# Patient Record
Sex: FEMALE | Race: WHITE | NOT HISPANIC OR LATINO | Employment: UNEMPLOYED | URBAN - METROPOLITAN AREA
[De-identification: names, ages, dates, MRNs, and addresses within clinical notes are randomized per-mention and may not be internally consistent; named-entity substitution may affect disease eponyms.]

---

## 2018-07-31 ENCOUNTER — HOSPITAL ENCOUNTER (EMERGENCY)
Facility: CLINIC | Age: 43
Discharge: HOME | End: 2018-08-01
Attending: EMERGENCY MEDICINE | Admitting: EMERGENCY MEDICINE
Payer: COMMERCIAL

## 2018-07-31 DIAGNOSIS — F10.920 ALCOHOLIC INTOXICATION WITHOUT COMPLICATION (H): ICD-10-CM

## 2018-07-31 DIAGNOSIS — R45.851 SUICIDE IDEATION: ICD-10-CM

## 2018-07-31 LAB
ALCOHOL BREATH TEST: 0.34 (ref 0–0.01)
ETHANOL SERPL-MCNC: 0.37 G/DL
INTERPRETATION ECG - MUSE: NORMAL

## 2018-07-31 PROCEDURE — 96372 THER/PROPH/DIAG INJ SC/IM: CPT | Performed by: EMERGENCY MEDICINE

## 2018-07-31 PROCEDURE — 25000132 ZZH RX MED GY IP 250 OP 250 PS 637: Performed by: EMERGENCY MEDICINE

## 2018-07-31 PROCEDURE — 90791 PSYCH DIAGNOSTIC EVALUATION: CPT

## 2018-07-31 PROCEDURE — 93005 ELECTROCARDIOGRAM TRACING: CPT | Performed by: EMERGENCY MEDICINE

## 2018-07-31 PROCEDURE — 80320 DRUG SCREEN QUANTALCOHOLS: CPT | Performed by: EMERGENCY MEDICINE

## 2018-07-31 PROCEDURE — 96360 HYDRATION IV INFUSION INIT: CPT | Performed by: EMERGENCY MEDICINE

## 2018-07-31 PROCEDURE — 96361 HYDRATE IV INFUSION ADD-ON: CPT | Performed by: EMERGENCY MEDICINE

## 2018-07-31 PROCEDURE — 99285 EMERGENCY DEPT VISIT HI MDM: CPT | Mod: 25 | Performed by: EMERGENCY MEDICINE

## 2018-07-31 PROCEDURE — 99284 EMERGENCY DEPT VISIT MOD MDM: CPT | Mod: 25 | Performed by: EMERGENCY MEDICINE

## 2018-07-31 PROCEDURE — 25000125 ZZHC RX 250: Performed by: EMERGENCY MEDICINE

## 2018-07-31 PROCEDURE — 93010 ELECTROCARDIOGRAM REPORT: CPT | Mod: Z6 | Performed by: EMERGENCY MEDICINE

## 2018-07-31 PROCEDURE — 25000128 H RX IP 250 OP 636: Performed by: EMERGENCY MEDICINE

## 2018-07-31 PROCEDURE — 82075 ASSAY OF BREATH ETHANOL: CPT | Performed by: EMERGENCY MEDICINE

## 2018-07-31 RX ORDER — POLYETHYLENE GLYCOL 3350 17 G
2 POWDER IN PACKET (EA) ORAL
Status: DISCONTINUED | OUTPATIENT
Start: 2018-07-31 | End: 2018-08-01 | Stop reason: HOSPADM

## 2018-07-31 RX ORDER — OLANZAPINE 10 MG/2ML
10 INJECTION, POWDER, FOR SOLUTION INTRAMUSCULAR ONCE
Status: COMPLETED | OUTPATIENT
Start: 2018-07-31 | End: 2018-07-31

## 2018-07-31 RX ORDER — SODIUM CHLORIDE 9 MG/ML
1000 INJECTION, SOLUTION INTRAVENOUS CONTINUOUS
Status: DISCONTINUED | OUTPATIENT
Start: 2018-07-31 | End: 2018-08-01 | Stop reason: HOSPADM

## 2018-07-31 RX ORDER — LANOLIN ALCOHOL/MO/W.PET/CERES
100 CREAM (GRAM) TOPICAL ONCE
Status: COMPLETED | OUTPATIENT
Start: 2018-07-31 | End: 2018-07-31

## 2018-07-31 RX ORDER — FOLIC ACID 1 MG/1
1 TABLET ORAL ONCE
Status: COMPLETED | OUTPATIENT
Start: 2018-07-31 | End: 2018-07-31

## 2018-07-31 RX ADMIN — OLANZAPINE 10 MG: 10 INJECTION, POWDER, LYOPHILIZED, FOR SOLUTION INTRAMUSCULAR at 19:30

## 2018-07-31 RX ADMIN — NICOTINE POLACRILEX 2 MG: 2 LOZENGE ORAL at 20:36

## 2018-07-31 RX ADMIN — SODIUM CHLORIDE 1000 ML: 9 INJECTION, SOLUTION INTRAVENOUS at 20:01

## 2018-07-31 RX ADMIN — FOLIC ACID 1 MG: 1 TABLET ORAL at 19:25

## 2018-07-31 RX ADMIN — SODIUM CHLORIDE 1000 ML: 9 INJECTION, SOLUTION INTRAVENOUS at 17:08

## 2018-07-31 RX ADMIN — Medication 100 MG: at 19:25

## 2018-07-31 RX ADMIN — SODIUM CHLORIDE 1000 ML: 9 INJECTION, SOLUTION INTRAVENOUS at 18:30
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
## 2018-07-31 NOTE — ED AVS SNAPSHOT
[facility], [place], Emergency Department    [address] [place] [address]    Phone:  [phone]                                       Pako Miller   MRN: [id number]    Department:  [facility], Emergency Department   Date of Visit:  7/31/2018           After Visit Summary Signature Page     I have received my discharge instructions, and my questions have been answered. I have discussed any challenges I see with this plan with the nurse or doctor.    ..........................................................................................................................................  Patient/Patient Representative Signature      ..........................................................................................................................................  Patient Representative Print Name and Relationship to Patient    ..................................................               ................................................  Date                                            Time    ..........................................................................................................................................  Reviewed by Signature/Title    ...................................................              ..............................................  Date                                                            Time
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
## 2018-07-31 NOTE — ED AVS SNAPSHOT
[facility], Emergency Department    [address] [facility] [address]    Phone:  [phone]                                       Pako Miller   MRN: [id number]    Department:  [facility], Emergency Department   Date of Visit:  7/31/2018           Patient Information     Date Of Birth          1975        Your diagnoses for this visit were:     Alcoholic intoxication without complication (H)     Suicide ideation        You were seen by Kristian Betancourt MD and Berny Moore MD.        Discharge Instructions         If you decide you would like help or are interested in detox please call to check bed availability.   To check the status of detox bed availability at [facility] call:  [phone]  To check the status of detox availability at [facility] call:  [phone]  To check the status of detox bed availability at [address] [place] [address] call:  [phone]  If you desire chemical dependency assessment or counseling, follow up with [facility]: [phone]    Alcohol Intoxication  Alcohol intoxication occurs when you drink alcohol faster than your liver can remove it from your system. The following facts are important to remember:    It can take 10 minutes or more to start to feel the effects of a drink, so you can easily get more intoxicated than you intended.    One drink may be more than 1 serving of alcohol. Depending on the drink, it can be 2 to 4 servings.    It takes about an hour for your body to metabolize (clear) 1 serving. If you have more than 1 drink, it can take a couple of hours or more.    Many things affect how drinks will affect you, including whether you ve eaten, how fast you drink, your size, how much you normally drink (or not), medicines you take, chronic diseases you have, and gender.  Signs and symptoms of alcohol poisoning  The following are signs and symptoms of alcohol poisoning:  Mild impairment    Reduced inhibitions    Slurred  "speech    Drowsiness    Decreased fine motor skills  Moderate impairment    Erratic behavior, aggression, depression    Impaired judgment    Confusion    Concentration difficulties    Coordination problems  Severe impairment    Vomiting    Seizures    Unconsciousness    Cold, clammy    Slow or irregular breathing    Hypothermia (low body temperature)    Coma  Health effects  Alcohol abuse causes health problems. Sometimes this can happen after only drinking a  little.\" There is no set number of drinks or amount of alcohol that defines too much. The more you drink at one time, and the more frequently you drink determine both the short-term and long-term health effects. It affects all parts of your body and your health, including your:    Brain. Alcohol is a central nervous system depressant. It can damage parts of the brain that affect your balance, memory, thinking, and emotions. It can cause memory loss, blackouts, depression, agitation, sleep cycle changes, and seizures. These changes may or may not be reversible.    Heart and vascular system. Alcohol affects multiple areas. It can damage heart muscle causing cardiomyopathy, which is a weakening and stretching of the heart muscle. This can lead to trouble breathing, an irregular heartbeat, atrial fibrillation, leg swelling, and heart failure. It makes the blood vessels stiffen causing hypertension (high blood pressure). All of these problems increase your risk of having heart attacks or strokes.    Liver. Alcohol causes fat to build up in the liver, affecting its normal function. This increases the risk for hepatitis, leading to abdominal pain, appetite loss, jaundice, bleeding problems, liver fibrosis, and cirrhosis. This in turn can affect your ability to fight off infections, and can cause diabetes. The liver changes prevent it from removing toxins in your blood that can cause encephalopathy. Signs of this are confusion, altered level of consciousness, " personality changes, memory loss, seizures, coma, and death.    Pancreas. Alcohol can cause inflammation of the pancreas, or pancreatitis. This can cause pain in your abdomen, fever, and diabetes.    Immune system. Alcohol weakens your immune system in a number of ways. It suppresses your immune system making it harder to fight off infections and colds. You will also have a higher risk of certain infections like pneumonia and tuberculosis.    Cancer risk. Alcohol raises your risk of cancer of the mouth, esophagus, pharynx, larynx, liver, and breast.    Sexual function. Alcohol abuse can also lead to sexual problems.  Alcohol use during pregnancy may cause permanent damage to the growing baby.  Home care  The following guidelines will help you care for yourself at home:    Don't drink any more alcohol.    Don't drive until all effects of the alcohol have worn off.    Don't operate machinery that can cause injuries.    Get lots of rest over the next few days. Drink plenty of water and other non-alcoholic liquids. Try to eat regular meals.    If you have been drinking heavily on a daily basis, you may go through alcohol withdrawal. The usual symptoms last 3 to 4 days and may include nervousness, shakiness, nausea, sweating, sleeplessness, and can even cause seizures and a serious withdrawal symptom called delirium tremens, or DTs. During this time, it is best that you stay with family or friends who can help and support you. You can also admit yourself to a residential detox program. If your symptoms are severe (seizures, severe shakiness, confusion), contact your doctor or call an ambulance for help (see below).   Follow-up care  If alcohol is a problem in your life, these are some organizations that can help you:    Alcoholics Anonymous offers support through a self-help fellowship. There are no dues or fees. See the Yellow Pages and call for time and place of meetings. Find AA online at www.aa.org.    [employer] offers  support to families of alcohol users. Contact 832-273-3876, or online at www.al-anon.org.    National [place] on Alcoholism and Drug Dependence can be reached at 635-437-6615, or online at www.ncadd.org.    There are also inpatient and residential alcohol detox programs. Check the Internet or phonebook Yellow Pages under  Drug Abuse and Treatment Centers.   Call 911  Call 911 if any of these occur:    Trouble breathing or slow irregular breathing    Chest pain    Sudden weakness on one side of your body or sudden trouble speaking    Heavy bleeding or vomiting blood    Very drowsy or trouble awakening    Fainting or loss of consciousness    Rapid heart rate    Seizure  When to seek medical advice  Call your healthcare provider right away if any of these occur:    Severe shakiness     Fever of 100.4 F (38 C) or higher, or as directed by your healthcare provider    Confusion or hallucinations (seeing, hearing, or feeling things that are not there)    Pain in your upper abdomen that gets worse    Repeated vomiting  Date Last Reviewed: 6/1/2016 2000-2017 [employer]. [address] [place] [address]. All rights reserved. This information is not intended as a substitute for professional medical care. Always follow your healthcare professional's instructions.          24 Hour Appointment Hotline       To make an appointment at any [facility], call [phone] ([phone]). If you don't have a family doctor or clinic, we will help you find one. [place] clinics are conveniently located to serve the needs of you and your family.             Review of your medicines      Our records show that you are taking the medicines listed below. If these are incorrect, please call your family doctor or clinic.        Dose / Directions Last dose taken    CoQ10 100 MG Caps   Dose:  100 mg        Take 100 mg by mouth daily   Refills:  0        FOLIC ACID PO        Take by mouth daily   Refills:  0         MAGNESIUM OXIDE PO   Dose:  250 mg        Take 250 mg by mouth daily   Refills:  0        Multi-vitamin Tabs tablet   Dose:  1 tablet   Quantity:  100 tablet        Take 1 tablet by mouth daily.   Refills:  3        naltrexone 380 MG Susr   Commonly known as:  VIVITROL   Dose:  380 mg        Inject 380 mg into the muscle once   Refills:  0        * VITAMIN B 12 PO        Refills:  0        * cyanocolbalamin 100 MCG tablet   Commonly known as:  vitamin  B-12   Dose:  500 mcg        Take 500 mcg by mouth daily   Refills:  0        WELLBUTRIN PO   Dose:  300 mg        Take 300 mg by mouth daily   Refills:  0        * Notice:  This list has 2 medication(s) that are the same as other medications prescribed for you. Read the directions carefully, and ask your doctor or other care provider to review them with you.            Procedures and tests performed during your visit     Alcohol breath test POCT    Alcohol ethyl    EKG 12-lead, tracing only      Orders Needing Specimen Collection     None      Pending Results     No orders found for last 3 day(s).            Pending Culture Results     No orders found for last 3 day(s).            Pending Results Instructions     If you had any lab results that were not finalized at the time of your Discharge, you can call the ED Lab Result RN at [phone]. You will be contacted by this team for any positive Lab results or changes in treatment. The nurses are available 7 days a week from 10A to 6:30P.  You can leave a message 24 hours per day and they will return your call.        Thank you for choosing [facility]       Thank you for choosing [facility] for your care. Our goal is always to provide you with excellent care. Hearing back from our patients is one way we can continue to improve our services. Please take a few minutes to complete the written survey that you may receive in the mail after you visit with us. Thank you!        [facility] Information     [facility] lets you send  "messages to your doctor, view your test results, renew your prescriptions, schedule appointments and more. To sign up, go to [email or web address] . Click on \"Log in\" on the left side of the screen, which will take you to the Welcome page. Then click on \"Sign up Now\" on the right side of the page.     You will be asked to enter the access code listed below, as well as some personal information. Please follow the directions to create your username and password.     Your access code is: [email or web address]  Expires: 10/29/2018  8:28 PM     Your access code will  in 90 days. If you need help or a new code, please call your [facility] clinic or [phone].        Care EveryWhere ID     This is your Care EveryWhere ID. This could be used by other organizations to access your [facility] medical records  [id number]        Equal Access to Services     [facility] : Hadii rodney Mckeon, waaxda luandrew, qaybta kaalmada aretha, ian bunn . So [facility] [phone].    ATENCIÓN: Si habla español, tiene a kulkarni disposición servicios gratuitos de asistencia lingüística. Llame al [phone].    We comply with applicable federal civil rights laws and [place] laws. We do not discriminate on the basis of race, color, national origin, age, disability, sex, sexual orientation, or gender identity.            After Visit Summary       This is your record. Keep this with you and show to your community pharmacist(s) and doctor(s) at your next visit.                  "

## 2018-08-01 VITALS
RESPIRATION RATE: 16 BRPM | SYSTOLIC BLOOD PRESSURE: 129 MMHG | OXYGEN SATURATION: 98 % | HEART RATE: 109 BPM | DIASTOLIC BLOOD PRESSURE: 79 MMHG | TEMPERATURE: 98.5 F

## 2018-08-01 PROCEDURE — 25000132 ZZH RX MED GY IP 250 OP 250 PS 637: Performed by: EMERGENCY MEDICINE

## 2018-08-01 RX ORDER — LORAZEPAM 0.5 MG/1
1 TABLET ORAL ONCE
Status: COMPLETED | OUTPATIENT
Start: 2018-08-01 | End: 2018-08-01

## 2018-08-01 RX ADMIN — LORAZEPAM 1 MG: 0.5 TABLET ORAL at 02:28

## 2018-08-01 RX ADMIN — LORAZEPAM 1 MG: 0.5 TABLET ORAL at 03:53

## 2018-08-01 NOTE — ED NOTES
Sign Out Provider: Dr. Moore    Sign Out Plan: 43-year-old female history of substance abuse, depression, anxiety who presents emergency department with a complaint of acute alcohol intoxication and suicide ideation.  Pending behavioral health assessment once clinically sober.    Reassessment: No acute interventions during my shift.  Patient had behavioral health assessment and I assess the patient as well.  Once patient clinically sober she is ambulating without difficulty, patient denies any suicidal thoughts, intent of self-harm, or suicide plan.  Patient is currently undergoing some stressors at home including her  who states that he wants to divorce her.  Patient would like to go home.  At this time no emergent need as patient is not a harm to herself or others.  Patient is clinically sober and denies any suicidal thoughts.  Patient does not want detox.  Plan for discharge home.  Return precautions discussed.  Patient understands and agrees the plan.    Disposition: discharge home       Elsa Hebert MD  08/01/18 8431

## 2018-08-01 NOTE — DISCHARGE INSTRUCTIONS
"  If you decide you would like help or are interested in detox please call to check bed availability.   To check the status of detox bed availability at [facility] call:  [phone]  To check the status of detox availability at [facility] call:  [phone]  To check the status of detox bed availability at [address] [place] [address] call:  [phone]  If you desire chemical dependency assessment or counseling, follow up with [facility]: [phone]    Alcohol Intoxication  Alcohol intoxication occurs when you drink alcohol faster than your liver can remove it from your system. The following facts are important to remember:    It can take 10 minutes or more to start to feel the effects of a drink, so you can easily get more intoxicated than you intended.    One drink may be more than 1 serving of alcohol. Depending on the drink, it can be 2 to 4 servings.    It takes about an hour for your body to metabolize (clear) 1 serving. If you have more than 1 drink, it can take a couple of hours or more.    Many things affect how drinks will affect you, including whether you ve eaten, how fast you drink, your size, how much you normally drink (or not), medicines you take, chronic diseases you have, and gender.  Signs and symptoms of alcohol poisoning  The following are signs and symptoms of alcohol poisoning:  Mild impairment    Reduced inhibitions    Slurred speech    Drowsiness    Decreased fine motor skills  Moderate impairment    Erratic behavior, aggression, depression    Impaired judgment    Confusion    Concentration difficulties    Coordination problems  Severe impairment    Vomiting    Seizures    Unconsciousness    Cold, clammy    Slow or irregular breathing    Hypothermia (low body temperature)    Coma  Health effects  Alcohol abuse causes health problems. Sometimes this can happen after only drinking a  little.\" There is no set number of drinks or amount of alcohol that defines too much. The " more you drink at one time, and the more frequently you drink determine both the short-term and long-term health effects. It affects all parts of your body and your health, including your:    Brain. Alcohol is a central nervous system depressant. It can damage parts of the brain that affect your balance, memory, thinking, and emotions. It can cause memory loss, blackouts, depression, agitation, sleep cycle changes, and seizures. These changes may or may not be reversible.    Heart and vascular system. Alcohol affects multiple areas. It can damage heart muscle causing cardiomyopathy, which is a weakening and stretching of the heart muscle. This can lead to trouble breathing, an irregular heartbeat, atrial fibrillation, leg swelling, and heart failure. It makes the blood vessels stiffen causing hypertension (high blood pressure). All of these problems increase your risk of having heart attacks or strokes.    Liver. Alcohol causes fat to build up in the liver, affecting its normal function. This increases the risk for hepatitis, leading to abdominal pain, appetite loss, jaundice, bleeding problems, liver fibrosis, and cirrhosis. This in turn can affect your ability to fight off infections, and can cause diabetes. The liver changes prevent it from removing toxins in your blood that can cause encephalopathy. Signs of this are confusion, altered level of consciousness, personality changes, memory loss, seizures, coma, and death.    Pancreas. Alcohol can cause inflammation of the pancreas, or pancreatitis. This can cause pain in your abdomen, fever, and diabetes.    Immune system. Alcohol weakens your immune system in a number of ways. It suppresses your immune system making it harder to fight off infections and colds. You will also have a higher risk of certain infections like pneumonia and tuberculosis.    Cancer risk. Alcohol raises your risk of cancer of the mouth, esophagus, pharynx, larynx, liver, and  breast.    Sexual function. Alcohol abuse can also lead to sexual problems.  Alcohol use during pregnancy may cause permanent damage to the growing baby.  Home care  The following guidelines will help you care for yourself at home:    Don't drink any more alcohol.    Don't drive until all effects of the alcohol have worn off.    Don't operate machinery that can cause injuries.    Get lots of rest over the next few days. Drink plenty of water and other non-alcoholic liquids. Try to eat regular meals.    If you have been drinking heavily on a daily basis, you may go through alcohol withdrawal. The usual symptoms last 3 to 4 days and may include nervousness, shakiness, nausea, sweating, sleeplessness, and can even cause seizures and a serious withdrawal symptom called delirium tremens, or DTs. During this time, it is best that you stay with family or friends who can help and support you. You can also admit yourself to a residential detox program. If your symptoms are severe (seizures, severe shakiness, confusion), contact your doctor or call an ambulance for help (see below).   Follow-up care  If alcohol is a problem in your life, these are some organizations that can help you:    Alcoholics Anonymous offers support through a self-help fellowship. There are no dues or fees. See the Yellow Pages and call for time and place of meetings. Find AA online at www.aa.org.    [employer] offers support to families of alcohol users. Contact 744-482-4529, or online at www.al-anobjorn.org.    National [place] on Alcoholism and Drug Dependence can be reached at 743-157-8752, or online at www.ncadd.org.    There are also inpatient and residential alcohol detox programs. Check the Internet or phonebook Yellow Pages under  Drug Abuse and Treatment Centers.   Call 911  Call 911 if any of these occur:    Trouble breathing or slow irregular breathing    Chest pain    Sudden weakness on one side of your body or sudden trouble speaking    Heavy  bleeding or vomiting blood    Very drowsy or trouble awakening    Fainting or loss of consciousness    Rapid heart rate    Seizure  When to seek medical advice  Call your healthcare provider right away if any of these occur:    Severe shakiness     Fever of 100.4 F (38 C) or higher, or as directed by your healthcare provider    Confusion or hallucinations (seeing, hearing, or feeling things that are not there)    Pain in your upper abdomen that gets worse    Repeated vomiting  Date Last Reviewed: 6/1/2016 2000-2017 [employer]. [address] [place] [address]. All rights reserved. This information is not intended as a substitute for professional medical care. Always follow your healthcare professional's instructions.

## 2018-08-01 NOTE — ED NOTES
Notified of pt's elevated heart rate and BP. Ativan ordered. We will give ativan and monitor pt's heart rate for a while.

## 2018-11-20 ENCOUNTER — HOSPITAL ENCOUNTER (INPATIENT)
Facility: CLINIC | Age: 43
LOS: 6 days | Discharge: HOME | End: 2018-11-26
Attending: EMERGENCY MEDICINE | Admitting: PSYCHIATRY & NEUROLOGY
Payer: MEDICAID

## 2018-11-20 DIAGNOSIS — F33.1 MODERATE EPISODE OF RECURRENT MAJOR DEPRESSIVE DISORDER (H): ICD-10-CM

## 2018-11-20 DIAGNOSIS — F10.10 ALCOHOL ABUSE: ICD-10-CM

## 2018-11-20 DIAGNOSIS — T74.21XA SEXUAL ASSAULT OF ADULT, INITIAL ENCOUNTER: ICD-10-CM

## 2018-11-20 DIAGNOSIS — F10.229 ACUTE ALCOHOLIC INTOXICATION IN ALCOHOLISM WITH COMPLICATION (H): ICD-10-CM

## 2018-11-20 DIAGNOSIS — T74.21XA RAPE, INITIAL ENCOUNTER: ICD-10-CM

## 2018-11-20 DIAGNOSIS — Z59.00 HOMELESS: ICD-10-CM

## 2018-11-20 LAB
ALBUMIN SERPL-MCNC: 3.5 G/DL (ref 3.4–5)
ALCOHOL BREATH TEST: 0.12 (ref 0–0.01)
ALCOHOL BREATH TEST: 0.24 (ref 0–0.01)
ALCOHOL BREATH TEST: 0.39 (ref 0–0.01)
ALP SERPL-CCNC: 67 U/L (ref 40–150)
ALT SERPL W P-5'-P-CCNC: 74 U/L (ref 0–50)
ANION GAP SERPL CALCULATED.3IONS-SCNC: 10 MMOL/L (ref 3–14)
AST SERPL W P-5'-P-CCNC: 170 U/L (ref 0–45)
BILIRUB SERPL-MCNC: 1.1 MG/DL (ref 0.2–1.3)
BUN SERPL-MCNC: 7 MG/DL (ref 7–30)
CALCIUM SERPL-MCNC: 7.2 MG/DL (ref 8.5–10.1)
CHLORIDE SERPL-SCNC: 99 MMOL/L (ref 94–109)
CO2 SERPL-SCNC: 29 MMOL/L (ref 20–32)
CREAT SERPL-MCNC: 0.5 MG/DL (ref 0.52–1.04)
GFR SERPL CREATININE-BSD FRML MDRD: >90 ML/MIN/1.7M2
GLUCOSE SERPL-MCNC: 79 MG/DL (ref 70–99)
POTASSIUM SERPL-SCNC: 3 MMOL/L (ref 3.4–5.3)
PROT SERPL-MCNC: 6.1 G/DL (ref 6.8–8.8)
SODIUM SERPL-SCNC: 138 MMOL/L (ref 133–144)

## 2018-11-20 PROCEDURE — 25000132 ZZH RX MED GY IP 250 OP 250 PS 637: Performed by: EMERGENCY MEDICINE

## 2018-11-20 PROCEDURE — 25000128 H RX IP 250 OP 636: Performed by: EMERGENCY MEDICINE

## 2018-11-20 PROCEDURE — 93005 ELECTROCARDIOGRAM TRACING: CPT | Performed by: EMERGENCY MEDICINE

## 2018-11-20 PROCEDURE — 96361 HYDRATE IV INFUSION ADD-ON: CPT | Performed by: EMERGENCY MEDICINE

## 2018-11-20 PROCEDURE — 85025 COMPLETE CBC W/AUTO DIFF WBC: CPT | Performed by: EMERGENCY MEDICINE

## 2018-11-20 PROCEDURE — 96360 HYDRATION IV INFUSION INIT: CPT | Performed by: EMERGENCY MEDICINE

## 2018-11-20 PROCEDURE — 82075 ASSAY OF BREATH ETHANOL: CPT | Performed by: EMERGENCY MEDICINE

## 2018-11-20 PROCEDURE — 93010 ELECTROCARDIOGRAM REPORT: CPT | Mod: Z6 | Performed by: EMERGENCY MEDICINE

## 2018-11-20 PROCEDURE — 12800008 ZZH R&B CD ADULT

## 2018-11-20 PROCEDURE — 99285 EMERGENCY DEPT VISIT HI MDM: CPT | Mod: 25 | Performed by: EMERGENCY MEDICINE

## 2018-11-20 PROCEDURE — HZ2ZZZZ DETOXIFICATION SERVICES FOR SUBSTANCE ABUSE TREATMENT: ICD-10-PCS | Performed by: PSYCHIATRY & NEUROLOGY

## 2018-11-20 PROCEDURE — 82075 ASSAY OF BREATH ETHANOL: CPT | Mod: 91 | Performed by: EMERGENCY MEDICINE

## 2018-11-20 PROCEDURE — 80053 COMPREHEN METABOLIC PANEL: CPT | Performed by: EMERGENCY MEDICINE

## 2018-11-20 RX ORDER — LANOLIN ALCOHOL/MO/W.PET/CERES
100 CREAM (GRAM) TOPICAL ONCE
Status: COMPLETED | OUTPATIENT
Start: 2018-11-20 | End: 2018-11-20

## 2018-11-20 RX ORDER — SODIUM CHLORIDE, SODIUM LACTATE, POTASSIUM CHLORIDE, CALCIUM CHLORIDE 600; 310; 30; 20 MG/100ML; MG/100ML; MG/100ML; MG/100ML
INJECTION, SOLUTION INTRAVENOUS CONTINUOUS
Status: DISCONTINUED | OUTPATIENT
Start: 2018-11-20 | End: 2018-11-21

## 2018-11-20 RX ORDER — BISACODYL 10 MG
10 SUPPOSITORY, RECTAL RECTAL DAILY PRN
Status: DISCONTINUED | OUTPATIENT
Start: 2018-11-20 | End: 2018-11-26 | Stop reason: HOSPADM

## 2018-11-20 RX ORDER — MAGNESIUM OXIDE 400 MG/1
800 TABLET ORAL ONCE
Status: COMPLETED | OUTPATIENT
Start: 2018-11-20 | End: 2018-11-20

## 2018-11-20 RX ORDER — DIAZEPAM 5 MG
5 TABLET ORAL ONCE
Status: COMPLETED | OUTPATIENT
Start: 2018-11-20 | End: 2018-11-20

## 2018-11-20 RX ORDER — OLANZAPINE 5 MG/1
5 TABLET, ORALLY DISINTEGRATING ORAL ONCE
Status: COMPLETED | OUTPATIENT
Start: 2018-11-20 | End: 2018-11-20

## 2018-11-20 RX ORDER — ACETAMINOPHEN 325 MG/1
650 TABLET ORAL EVERY 4 HOURS PRN
Status: DISCONTINUED | OUTPATIENT
Start: 2018-11-20 | End: 2018-11-26 | Stop reason: HOSPADM

## 2018-11-20 RX ORDER — SODIUM CHLORIDE 9 MG/ML
1000 INJECTION, SOLUTION INTRAVENOUS CONTINUOUS
Status: DISCONTINUED | OUTPATIENT
Start: 2018-11-20 | End: 2018-11-26 | Stop reason: HOSPADM

## 2018-11-20 RX ORDER — TRAZODONE HYDROCHLORIDE 50 MG/1
50 TABLET, FILM COATED ORAL
Status: DISCONTINUED | OUTPATIENT
Start: 2018-11-20 | End: 2018-11-26 | Stop reason: HOSPADM

## 2018-11-20 RX ORDER — ALUMINA, MAGNESIA, AND SIMETHICONE 2400; 2400; 240 MG/30ML; MG/30ML; MG/30ML
30 SUSPENSION ORAL EVERY 4 HOURS PRN
Status: DISCONTINUED | OUTPATIENT
Start: 2018-11-20 | End: 2018-11-26 | Stop reason: HOSPADM

## 2018-11-20 RX ORDER — DIAZEPAM 5 MG
5-20 TABLET ORAL EVERY 30 MIN PRN
Status: DISCONTINUED | OUTPATIENT
Start: 2018-11-20 | End: 2018-11-26 | Stop reason: HOSPADM

## 2018-11-20 RX ORDER — HYDROXYZINE HYDROCHLORIDE 25 MG/1
25 TABLET, FILM COATED ORAL EVERY 4 HOURS PRN
Status: DISCONTINUED | OUTPATIENT
Start: 2018-11-20 | End: 2018-11-26 | Stop reason: HOSPADM

## 2018-11-20 RX ORDER — FOLIC ACID 1 MG/1
1 TABLET ORAL ONCE
Status: COMPLETED | OUTPATIENT
Start: 2018-11-20 | End: 2018-11-20

## 2018-11-20 RX ORDER — MULTIVITAMIN,THERAPEUTIC
1 TABLET ORAL ONCE
Status: COMPLETED | OUTPATIENT
Start: 2018-11-20 | End: 2018-11-20

## 2018-11-20 RX ADMIN — THIAMINE HCL TAB 100 MG 100 MG: 100 TAB at 23:06

## 2018-11-20 RX ADMIN — SODIUM CHLORIDE 1000 ML: 9 INJECTION, SOLUTION INTRAVENOUS at 13:44

## 2018-11-20 RX ADMIN — OLANZAPINE 5 MG: 5 TABLET, ORALLY DISINTEGRATING ORAL at 11:52

## 2018-11-20 RX ADMIN — MAGNESIUM OXIDE TAB 400 MG (241.3 MG ELEMENTAL MG) 800 MG: 400 (241.3 MG) TAB at 23:05

## 2018-11-20 RX ADMIN — DIAZEPAM 5 MG: 5 TABLET ORAL at 23:06

## 2018-11-20 RX ADMIN — THERA TABS 1 TABLET: TAB at 23:06

## 2018-11-20 RX ADMIN — FOLIC ACID 1 MG: 1 TABLET ORAL at 23:06

## 2018-11-20 RX ADMIN — SODIUM CHLORIDE 1000 ML: 9 INJECTION, SOLUTION INTRAVENOUS at 23:09

## 2018-11-20 RX ADMIN — SODIUM CHLORIDE 1000 ML: 9 INJECTION, SOLUTION INTRAVENOUS at 12:15

## 2018-11-20 SDOH — ECONOMIC STABILITY - HOUSING INSECURITY: HOMELESSNESS UNSPECIFIED: Z59.00

## 2018-11-20 ASSESSMENT — ENCOUNTER SYMPTOMS
NERVOUS/ANXIOUS: 1
SHORTNESS OF BREATH: 0

## 2018-11-20 NOTE — IP AVS SNAPSHOT
MRN:[id number]                      After Visit Summary   11/20/2018    Pako Miller    MRN: [id number]           Thank you!     Thank you for choosing [facility] for your care. Our goal is always to provide you with excellent care.        Patient Information     Date Of Birth          1975        Designated Caregiver       Most Recent Value    Caregiver    Will someone help with your care after discharge? no      About your hospital stay     You were admitted on:  November 20, 2018 You last received care in the: [facility]    You were discharged on:  November 26, 2018       Who to Call     For medical emergencies, please call 911.  For non-urgent questions about your medical care, please call your primary care provider or clinic, [phone]          Attending Provider     Provider Specialty    Kristian Betancourt MD Emergency Medicine    Tristin, Kody Edmonds MD Emergency Medicine    Latricia Riley MD Psychiatry    Grisel, Edith Torrez MD Psychiatry    Naomi, MD Jeannette Psychiatry       Primary Care Provider Office Phone # Fax #    Allen Chavez [phone]      Further instructions from your care team       Behavioral Discharge Planning and Instructions  THANK YOU FOR CHOOSING THE [address]  [phone]    Summary: You were admitted to [facility] on 11/20/18 for detoxification from Alcohol.  A medical exam was performed that included lab work. You have met with a  and opted to follow-up with residential treatment referral from home.  Please take care and make your recovery a priority!    Main Diagnosis:  Per  Dr. Recinos/Naomi  Alcohol use disorder, severe.   Borderline personality disorder,     Major depressive disorder, recurrent with non-psychotic features.       Major Treatments, Procedures and Findings:  You were detoxed from alcohol. You have met with a  to develop a  treatment plan for discharge.  You have had labs drawn and a copy of those labs will be sent home with you. Your alcohol withdrawal is complete and you are being discharged today.  Please bring your lab results with to your follow up doctor appointment.  Make your recovery a priority!                        Symptoms to Report:  If you experience more anxiety, confusion, sleeplessness, deep sadness or thoughts of suicide, notify your treatment team or notify your primary care physician. IF ANY OF THE SYMPTOMS YOU ARE EXPERIENCING ARE A MEDICAL EMERGENCY CALL 911 IMMEDIATELY.     Lifestyle Adjustment: Adjust your lifestyle to get enough sleep, relaxation, exercise and  good nutrition. Continue to develop healthy coping skills to decrease stress and promote a sober living environment. Do not use alcohol, illegal drugs or addictive medications other than what is currently prescribed. AA, NA, and  Sponsor are excellent resources for support.     Disposition: Home    Primary Provider:  Dr. Allen Chavez  [facility]  [address] [facility] [place] [address]  Appointment: TBD pending [place] approval    Treatment Follow-Up:  Follow-up with [facility] at [phone] to verify you have been approved for funding at [facility].    [facility]  [phone]  When funding has been approved by [facility], please contact [facility] to schedule admission date and time.    Resources:     [facility] [phone]  Support Group:  AA/NA and Sponsor/support  Crisis Intervention: 209.935.6038 or 103-820-4126 (TTY: 702.481.4425).  Call anytime for help.  National [place] on Mental Illness (www.[place].virgie.org): 795.741.9281 or 902-578-0155.  Alcoholics Anonymous (www.alcoholics-anonymous.org): Check your phone book for your local chapter.  Suicide Awareness Voices of Education (SAVE) (www.save.org): 592-130-QGEB (0705)  National Suicide Prevention Line (www.mentalhealthmn.org): 670-098-SELM (6984)  Mental  Health Consumer/Survivor Network of [place] (www.mhcsn.net): 983.528.5699 or 374-536-1232  Mental Health Association of [place] (www.mentalhealth.org): 270.553.8262 or 956-910-7862   Substance Abuse and Mental Health Services (www.sama.gov)    [facility] ([facility])  [facility] connects people seeking recovery to resources that help foster and sustain long-term recovery.  Whether you are seeking resources for treatment, transportation, housing, job training, education, health care or other pathways to recovery, [facility] is a great place to start.  [phone].  Www.[facility].org    General Medication Instructions:   See your medication sheet(s) for instructions.   Take all medicines as directed.  Make no changes unless your doctor suggests them.   Go to all your doctor visits.  Be sure to have all your required lab tests. This way, your medicines can be refilled on time.  Do not use any drugs not prescribed by your provider.  AA/NA and Sponsors are excellent resources for support  Avoid alcohol.    Please Note:  If you have any questions at anytime after you are discharged please call the [facility] detox unit 3AW unit at [phone].    [facility], Behavioral Intake [phone]    Please take this discharge folder with you to all your follow up appointments, it contains your lab results, diagnosis, medication list and discharge recommendations.      THANK YOU FOR CHOOSING THE [facility]     Pending Results     No orders found from 11/18/2018 to 11/21/2018.            Statement of Approval     Ordered          11/26/18 1112  I have reviewed and agree with all the recommendations and orders detailed in this document.  EFFECTIVE NOW     Approved and electronically signed by:  Jeannette Salgado MD             Admission Information     Date & Time Provider Department Dept. Phone    11/20/2018 Jeannette Salgado MD [facility] [phone]      Your Vitals Were     Blood Pressure Pulse Temperature Respirations Height Weight    98/65 107 97  F (36.1  C) (Oral) 16 1.6 m (5' 3\") 69.9 kg (154 lb)    Pulse Oximetry BMI (Body Mass Index)                99% 27.28 kg/m2          [employer] Information     [employer] lets you send messages to your doctor, view your test results, renew your prescriptions, schedule appointments and more. To sign up, go to [email or web address] . Click on \"Log in\" on the left side of the screen, which will take you to the Welcome page. Then click on \"Sign up Now\" on the right side of the page.     You will be asked to enter the access code listed below, as well as some personal information. Please follow the directions to create your username and password.     Your access code is: [email or web address]  Expires: 2019  1:33 PM     Your access code will  in 90 days. If you need help or a new code, please call your [facility] clinic or [phone].        Care EveryWhere ID     This is your Care EveryWhere ID. This could be used by other organizations to access your [facility] medical records  [id number]        Equal Access to Services     SHIMA EVANS AH: Sourav schneidero Sojaylen, waaxda luqadaha, qaybta kaalmada adeegyada, ian camargo. So [facility] [phone].    ATENCIÓN: Si habla español, tiene a kulkarni disposición servicios gratuitos de asistencia lingüística. Llame al [phone].    We comply with applicable federal civil rights laws and [place] laws. We do not discriminate on the basis of race, color, national origin, age, disability, sex, sexual orientation, or gender identity.               Review of your medicines      START taking        Dose / Directions    disulfiram 250 MG tablet   Commonly known as:  ANTABUSE   Used for:  Alcohol abuse        Dose:  250 mg   Take 1 tablet (250 mg) by mouth daily   Quantity:  30 tablet   Refills:  0       prazosin 1 MG capsule "   Commonly known as:  MINIPRESS   Used for:  Alcohol abuse        Dose:  1 mg   Take 1 capsule (1 mg) by mouth At Bedtime   Quantity:  30 capsule   Refills:  0       vitamin B1 100 MG tablet   Commonly known as:  THIAMINE   Used for:  Alcohol abuse        Dose:  100 mg   Start taking on:  11/27/2018   Take 1 tablet (100 mg) by mouth daily   Quantity:  30 tablet   Refills:  0         CONTINUE these medicines which may have CHANGED, or have new prescriptions. If we are uncertain of the size of tablets/capsules you have at home, strength may be listed as something that might have changed.        Dose / Directions    FLUoxetine 40 MG capsule   Commonly known as:  PROzac   This may have changed:    - medication strength  - how much to take   Used for:  Alcohol abuse        Dose:  40 mg   Take 1 capsule (40 mg) by mouth daily   Quantity:  30 capsule   Refills:  0       melatonin 5 MG tablet   This may have changed:    - medication strength  - how much to take   Used for:  Alcohol abuse        Dose:  5-10 mg   Take 1-2 tablets (5-10 mg) by mouth nightly as needed for sleep   Quantity:  60 tablet   Refills:  0         CONTINUE these medicines which have NOT CHANGED        Dose / Directions    multivitamin, therapeutic with minerals Tabs tablet   Used for:  Acute alcoholic intoxication in alcoholism with complication (H)        Dose:  1 tablet   Start taking on:  11/27/2018   Take 1 tablet by mouth daily   Quantity:  30 each   Refills:  0         STOP taking     baclofen 20 MG tablet   Commonly known as:  LIORESAL           propranolol 20 MG tablet   Commonly known as:  INDERAL           vitamin B-Complex           ziprasidone 20 MG capsule   Commonly known as:  GEODON                Where to get your medicines      These medications were sent to [facility] [place], [place] - [address]  [address] [address]     Phone:  [phone]     disulfiram 250 MG tablet    FLUoxetine 40  MG capsule    melatonin 5 MG tablet    multivitamin, therapeutic with minerals Tabs tablet    prazosin 1 MG capsule    vitamin B1 100 MG tablet                Protect others around you: Learn how to safely use, store and throw away your medicines at www.disposemymeds.org.             Medication List: This is a list of all your medications and when to take them. Check marks below indicate your daily home schedule. Keep this list as a reference.      Medications           Morning Afternoon Evening Bedtime As Needed    disulfiram 250 MG tablet   Commonly known as:  ANTABUSE   Take 1 tablet (250 mg) by mouth daily                                   FLUoxetine 40 MG capsule   Commonly known as:  PROzac   Take 1 capsule (40 mg) by mouth daily   Last time this was given:  20 mg on 11/26/2018  8:35 AM                                   melatonin 5 MG tablet   Take 1-2 tablets (5-10 mg) by mouth nightly as needed for sleep   Last time this was given:  10 mg on 11/25/2018  8:08 PM                                   multivitamin, therapeutic with minerals Tabs tablet   Take 1 tablet by mouth daily   Start taking on:  11/27/2018   Last time this was given:  1 tablet on 11/26/2018  8:35 AM                                   prazosin 1 MG capsule   Commonly known as:  MINIPRESS   Take 1 capsule (1 mg) by mouth At Bedtime   Last time this was given:  1 mg on 11/25/2018 10:23 PM                                   vitamin B1 100 MG tablet   Commonly known as:  THIAMINE   Take 1 tablet (100 mg) by mouth daily   Start taking on:  11/27/2018   Last time this was given:  100 mg on 11/26/2018  8:35 AM

## 2018-11-20 NOTE — IP AVS SNAPSHOT
[facility]    [address] [place] [address]    Phone:  [phone]                                       After Visit Summary   11/20/2018    Pako Miller    MRN: [id number]           After Visit Summary Signature Page     I have received my discharge instructions, and my questions have been answered. I have discussed any challenges I see with this plan with the nurse or doctor.    ..........................................................................................................................................  Patient/Patient Representative Signature      ..........................................................................................................................................  Patient Representative Print Name and Relationship to Patient    ..................................................               ................................................  Date                                   Time    ..........................................................................................................................................  Reviewed by Signature/Title    ...................................................              ..............................................  Date                                               Time          22EPIC Rev 08/18

## 2018-11-20 NOTE — PROGRESS NOTES
Pt belongings:      - Shoes   - Purse  - Suitcase  - Jacket   (pt ETOH, unable to ask about valuables in suitcase)

## 2018-11-20 NOTE — ED NOTES
Bed: ED  Expected date: 11/20/18  Expected time: 10:30 AM  Means of arrival: Ambulance  Comments:  Milly ETOH 44 yo Female

## 2018-11-20 NOTE — ED NOTES
[place] police . Case number is [id number]  who called was Perfecto Rabago Call if we want them to  the SARS kit.
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
## 2018-11-20 NOTE — ED NOTES
"SARS Nurse returned call, they would like us to call them back when patient is \"clinically stable/sober\" Pt Nurse, Lexy Almaguer informed.  "

## 2018-11-21 LAB
BASOPHILS # BLD AUTO: 0 10E9/L (ref 0–0.2)
BASOPHILS NFR BLD AUTO: 0.4 %
DIFFERENTIAL METHOD BLD: ABNORMAL
EOSINOPHIL # BLD AUTO: 0 10E9/L (ref 0–0.7)
EOSINOPHIL NFR BLD AUTO: 1.1 %
ERYTHROCYTE [DISTWIDTH] IN BLOOD BY AUTOMATED COUNT: 14.6 % (ref 10–15)
HCT VFR BLD AUTO: 38.8 % (ref 35–47)
HGB BLD-MCNC: 12.9 G/DL (ref 11.7–15.7)
IMM GRANULOCYTES # BLD: 0 10E9/L (ref 0–0.4)
IMM GRANULOCYTES NFR BLD: 0.4 %
INTERPRETATION ECG - MUSE: NORMAL
LYMPHOCYTES # BLD AUTO: 1 10E9/L (ref 0.8–5.3)
LYMPHOCYTES NFR BLD AUTO: 36.4 %
MCH RBC QN AUTO: 32.4 PG (ref 26.5–33)
MCHC RBC AUTO-ENTMCNC: 33.2 G/DL (ref 31.5–36.5)
MCV RBC AUTO: 98 FL (ref 78–100)
MONOCYTES # BLD AUTO: 0.2 10E9/L (ref 0–1.3)
MONOCYTES NFR BLD AUTO: 7.8 %
NEUTROPHILS # BLD AUTO: 1.5 10E9/L (ref 1.6–8.3)
NEUTROPHILS NFR BLD AUTO: 53.9 %
NRBC # BLD AUTO: 0 10*3/UL
NRBC BLD AUTO-RTO: 0 /100
PLATELET # BLD AUTO: 52 10E9/L (ref 150–450)
POTASSIUM SERPL-SCNC: 3.2 MMOL/L (ref 3.4–5.3)
RBC # BLD AUTO: 3.98 10E12/L (ref 3.8–5.2)
WBC # BLD AUTO: 2.8 10E9/L (ref 4–11)

## 2018-11-21 PROCEDURE — 99221 1ST HOSP IP/OBS SF/LOW 40: CPT | Mod: AI | Performed by: PSYCHIATRY & NEUROLOGY

## 2018-11-21 PROCEDURE — 84132 ASSAY OF SERUM POTASSIUM: CPT | Performed by: PHYSICIAN ASSISTANT

## 2018-11-21 PROCEDURE — 25000132 ZZH RX MED GY IP 250 OP 250 PS 637: Performed by: PHYSICIAN ASSISTANT

## 2018-11-21 PROCEDURE — 25000131 ZZH RX MED GY IP 250 OP 636 PS 637: Performed by: PSYCHIATRY & NEUROLOGY

## 2018-11-21 PROCEDURE — 25000132 ZZH RX MED GY IP 250 OP 250 PS 637: Performed by: EMERGENCY MEDICINE

## 2018-11-21 PROCEDURE — 99207 ZZC DOWN CODE DUE TO INITIAL EXAM: CPT | Performed by: PSYCHIATRY & NEUROLOGY

## 2018-11-21 PROCEDURE — 12800008 ZZH R&B CD ADULT

## 2018-11-21 PROCEDURE — 99232 SBSQ HOSP IP/OBS MODERATE 35: CPT | Performed by: PHYSICIAN ASSISTANT

## 2018-11-21 PROCEDURE — 99207 ZZC CONSULT E&M CHANGED TO SUBSEQUENT LEVEL: CPT | Performed by: PHYSICIAN ASSISTANT

## 2018-11-21 PROCEDURE — 36415 COLL VENOUS BLD VENIPUNCTURE: CPT | Performed by: PHYSICIAN ASSISTANT

## 2018-11-21 PROCEDURE — 25000132 ZZH RX MED GY IP 250 OP 250 PS 637: Performed by: PSYCHIATRY & NEUROLOGY

## 2018-11-21 RX ORDER — ONDANSETRON 4 MG/1
4 TABLET, ORALLY DISINTEGRATING ORAL EVERY 6 HOURS PRN
Status: DISCONTINUED | OUTPATIENT
Start: 2018-11-21 | End: 2018-11-26 | Stop reason: HOSPADM

## 2018-11-21 RX ORDER — POTASSIUM CHLORIDE 750 MG/1
40 TABLET, EXTENDED RELEASE ORAL ONCE
Status: COMPLETED | OUTPATIENT
Start: 2018-11-21 | End: 2018-11-21

## 2018-11-21 RX ORDER — ATENOLOL 50 MG/1
50 TABLET ORAL DAILY PRN
Status: DISCONTINUED | OUTPATIENT
Start: 2018-11-21 | End: 2018-11-26 | Stop reason: HOSPADM

## 2018-11-21 RX ORDER — ALPRAZOLAM 0.5 MG
0.5 TABLET ORAL 2 TIMES DAILY PRN
Status: ON HOLD | COMMUNITY
End: 2018-11-21

## 2018-11-21 RX ORDER — PHENOL 1.4 %
10 AEROSOL, SPRAY (ML) MUCOUS MEMBRANE
Status: ON HOLD | COMMUNITY
End: 2018-11-26

## 2018-11-21 RX ORDER — PRAZOSIN HYDROCHLORIDE 1 MG/1
1 CAPSULE ORAL AT BEDTIME
Status: DISCONTINUED | OUTPATIENT
Start: 2018-11-21 | End: 2018-11-26 | Stop reason: HOSPADM

## 2018-11-21 RX ORDER — FLUOXETINE 10 MG/1
10 CAPSULE ORAL DAILY
Status: DISPENSED | OUTPATIENT
Start: 2018-11-21 | End: 2018-11-23

## 2018-11-21 RX ORDER — BACLOFEN 10 MG/1
100 TABLET ORAL 3 TIMES DAILY
Status: ON HOLD | COMMUNITY
End: 2018-11-21

## 2018-11-21 RX ORDER — POTASSIUM CHLORIDE 1.5 G/1.58G
20 POWDER, FOR SOLUTION ORAL ONCE
Status: DISCONTINUED | OUTPATIENT
Start: 2018-11-21 | End: 2018-11-21

## 2018-11-21 RX ORDER — PROPRANOLOL HYDROCHLORIDE 20 MG/1
20 TABLET ORAL 2 TIMES DAILY
Status: ON HOLD | COMMUNITY
Start: 2018-08-31 | End: 2018-11-26

## 2018-11-21 RX ORDER — BACLOFEN 20 MG/1
20 TABLET ORAL 3 TIMES DAILY
Status: ON HOLD | COMMUNITY
End: 2018-11-26

## 2018-11-21 RX ORDER — POTASSIUM CHLORIDE 750 MG/1
20 TABLET, EXTENDED RELEASE ORAL ONCE
Status: COMPLETED | OUTPATIENT
Start: 2018-11-21 | End: 2018-11-21

## 2018-11-21 RX ORDER — ZIPRASIDONE HYDROCHLORIDE 20 MG/1
20 CAPSULE ORAL AT BEDTIME
Status: ON HOLD | COMMUNITY
End: 2018-11-26

## 2018-11-21 RX ADMIN — TRAZODONE HYDROCHLORIDE 50 MG: 50 TABLET ORAL at 22:34

## 2018-11-21 RX ADMIN — DIAZEPAM 5 MG: 5 TABLET ORAL at 03:00

## 2018-11-21 RX ADMIN — DIAZEPAM 10 MG: 5 TABLET ORAL at 00:45

## 2018-11-21 RX ADMIN — DIAZEPAM 10 MG: 5 TABLET ORAL at 11:39

## 2018-11-21 RX ADMIN — DIAZEPAM 10 MG: 5 TABLET ORAL at 03:23

## 2018-11-21 RX ADMIN — DIAZEPAM 10 MG: 5 TABLET ORAL at 20:13

## 2018-11-21 RX ADMIN — POTASSIUM CHLORIDE 20 MEQ: 750 TABLET, EXTENDED RELEASE ORAL at 02:54

## 2018-11-21 RX ADMIN — ATENOLOL 50 MG: 50 TABLET ORAL at 10:42

## 2018-11-21 RX ADMIN — POTASSIUM CHLORIDE 40 MEQ: 750 TABLET, EXTENDED RELEASE ORAL at 13:05

## 2018-11-21 RX ADMIN — DIAZEPAM 10 MG: 5 TABLET ORAL at 04:28

## 2018-11-21 RX ADMIN — DIAZEPAM 10 MG: 5 TABLET ORAL at 09:18

## 2018-11-21 RX ADMIN — PRAZOSIN HYDROCHLORIDE 1 MG: 1 CAPSULE ORAL at 22:34

## 2018-11-21 RX ADMIN — DIAZEPAM 10 MG: 5 TABLET ORAL at 08:17

## 2018-11-21 RX ADMIN — DIAZEPAM 10 MG: 5 TABLET ORAL at 16:13

## 2018-11-21 RX ADMIN — DIAZEPAM 10 MG: 5 TABLET ORAL at 18:07

## 2018-11-21 RX ADMIN — FLUOXETINE 10 MG: 10 CAPSULE ORAL at 16:13

## 2018-11-21 RX ADMIN — DIAZEPAM 10 MG: 5 TABLET ORAL at 05:23

## 2018-11-21 RX ADMIN — HYDROXYZINE HYDROCHLORIDE 25 MG: 25 TABLET, FILM COATED ORAL at 04:28

## 2018-11-21 RX ADMIN — ONDANSETRON 4 MG: 4 TABLET, ORALLY DISINTEGRATING ORAL at 13:05

## 2018-11-21 ASSESSMENT — ACTIVITIES OF DAILY LIVING (ADL)
EATING: 0 - INDEPENDENT
WHICH_OF_THE_ABOVE_FUNCTIONAL_RISKS_HAD_A_RECENT_ONSET_OR_CHANGE?: FALL HISTORY
COGNITION: 0 - NO COGNITION ISSUES REPORTED
BATHING: 0 - INDEPENDENT
TOILETING: 0 - INDEPENDENT
DRESS: 0 - INDEPENDENT
AMBULATION: 0-->INDEPENDENT
TRANSFERRING: 0-->INDEPENDENT
CHANGE_IN_FUNCTIONAL_STATUS_SINCE_ONSET_OF_CURRENT_ILLNESS/INJURY: NO
TOILETING: 0-->INDEPENDENT
FALL_HISTORY_WITHIN_LAST_SIX_MONTHS: YES
DRESS: 0-->INDEPENDENT
AMBULATION: 0 - INDEPENDENT
SWALLOWING: 0-->SWALLOWS FOODS/LIQUIDS WITHOUT DIFFICULTY
NUMBER_OF_TIMES_PATIENT_HAS_FALLEN_WITHIN_LAST_SIX_MONTHS: 10
RETIRED_COMMUNICATION: 0-->UNDERSTANDS/COMMUNICATES WITHOUT DIFFICULTY
GROOMING: HANDWASHING;INDEPENDENT
DRESS: SCRUBS (BEHAVIORAL HEALTH)
RETIRED_EATING: 0-->INDEPENDENT
COMMUNICATION: 0 - UNDERSTANDS/COMMUNICATES WITHOUT DIFFICULTY
BATHING: 0-->INDEPENDENT
TRANSFERRING: 0 - INDEPENDENT
SWALLOWING: 0 - SWALLOWS FOODS/LIQUIDS WITHOUT DIFFICULTY
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
## 2018-11-21 NOTE — PROGRESS NOTES
Potassium 3.0, repeat potassium has been ordered. Pt remains tachy P 134/MSSA 19 given valium 10mg and P 122/MSSA valium 10 am repeated. Informed medicine and atenolol PRN will be ordered if pulse remains high 1 hr post Valium.
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
## 2018-11-21 NOTE — ED PROVIDER NOTES
The SARS nurse did come and evaluate the patient.  The patient is refusing SARS exam at this time.  She is refusing Plan B and HIV prophylaxis.  She states that she would consider doing STI prophylaxis at a later time, though not tonight, she does not feel good currently.  The SARS nurse is recommending ceftriaxone 250 mg IM x1, azithromycin 1 g p.o. x1, and Flagyl 2 g p.o. x1, when the patient is agreeable to taking this.  This should be offered again to the patient prior to discharge in the hospital.         Jael Keenan MD  11/21/18 0038

## 2018-11-21 NOTE — PLAN OF CARE
Problem: Patient Care Overview  Goal: Team Discussion  Team Plan:   BEHAVIORAL TEAM DISCUSSION    Participants: Dr. Larticia Riley MD; Maite JUÁREZ LPC  Progress: Initial  Continued Stay Criteria/Rationale: Pt was admitted to [facility] station 3A for symptoms of withdrawal. Symptoms are being managed and treated by 3A medical staff.  Medical/Physical: Deferred to medicine.  Precautions:   Behavioral Orders   Procedures     Code 1 - Restrict to Unit     Routine Programming     As clinically indicated     Status 15     Every 15 minutes.     Withdrawal precautions     Plan: Monitor, assess, stabilize. Pt requests referral to residential treatment. Case management will assist with assessment and referrals.  Rationale for change in precautions or plan: N/A

## 2018-11-21 NOTE — PROGRESS NOTES
Clinical Nutrition Services Brief Note - (+) admission nutrition risk screen for weight loss    Patient is a 43 year old female with an admission nutrition risk screen positive for unintentional weight loss of 10 lbs or more in the past 2 months.    Per documented weight history and review of CareEverywhere, pt's wt was 72.1 kg (159 lb) on 8/26 (~2 mo ago). This indicates wt loss of 5 lb (2.6%), which does not meet admission risk screen criteria and is not significant wt loss.  Wt Readings from Last 5 Encounters:   11/21/18 69.9 kg (154 lb)   06/03/15 76.3 kg (168 lb 4.8 oz)   10/24/14 67.6 kg (149 lb)   11/05/13 63.6 kg (140 lb 4.8 oz)   09/02/13 59 kg (130 lb)     Due to inappropriate (+) admission nutrition risk screen for weight loss and patient likely to have improved PO when here and not using substances, RD to sign off at this time. RD available by consult if further nutrition intervention warranted prior to discharge.    Zee Munoz RD, LD  Unit pgr: [phone]
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
## 2018-11-21 NOTE — PROGRESS NOTES
11/21/18 0354   Patient Belongings   Did you bring any home meds/supplements to the hospital?  No   Patient Belongings clothing;glasses;other (see comments)   Disposition of Belongings Kept with patient;Sent to security per site process  (Tote, Security, Bin)   Belongings Search Yes   Clothing Search Yes   Second Staff Aspen Brunner   General Info Comment None   Tote: shoes, blue shirt, folder, black jacket, suitcase (black leather jacket, white top 2, dress, paperwork, blue shorts, black top, razor, tights), blue make-up bag, nurse, belt, purple purse with change, make-up, toiletries, chapstick, 2 lighters, keys, gum, perfume, contacts, nail file, hoodie with string, floral bag with hygiene products,                               Bin: 2 tablets, cell phone (cracked screen), wallet, 2 chargers    Security [id number]: [facility] ID, [place] drivers license 2, [employer] card 2, medica card, [place] insurance card, metro card, mastercard, [place] card, [employer] card, $40 cash, 2 visa, passport, checkbook  A               Admission:  I am responsible for any personal items that are not sent to the safe or pharmacy.  [facility] is not responsible for loss, theft or damage of any property in my possession.    Signature:  _________________________________ Date: _______  Time: _____                                              Staff Signature:  ____________________________ Date: ________  Time: _____      2nd Staff person, if patient is unable/unwilling to sign:    Signature: ________________________________ Date: ________  Time: _____     Discharge:  [facility] has returned all of my personal belongings:    Signature: _________________________________ Date: ________  Time: _____                                          Staff Signature:  ____________________________ Date: ________  Time: _____

## 2018-11-22 LAB — POTASSIUM SERPL-SCNC: 3.8 MMOL/L (ref 3.4–5.3)

## 2018-11-22 PROCEDURE — 25000131 ZZH RX MED GY IP 250 OP 636 PS 637: Performed by: PSYCHIATRY & NEUROLOGY

## 2018-11-22 PROCEDURE — 36415 COLL VENOUS BLD VENIPUNCTURE: CPT | Performed by: PHYSICIAN ASSISTANT

## 2018-11-22 PROCEDURE — 25000132 ZZH RX MED GY IP 250 OP 250 PS 637: Performed by: PHYSICIAN ASSISTANT

## 2018-11-22 PROCEDURE — 84132 ASSAY OF SERUM POTASSIUM: CPT | Performed by: PHYSICIAN ASSISTANT

## 2018-11-22 PROCEDURE — 12800012 ZZH R&B CD MH INTERMEDIATE ADULT

## 2018-11-22 PROCEDURE — 25000132 ZZH RX MED GY IP 250 OP 250 PS 637: Performed by: EMERGENCY MEDICINE

## 2018-11-22 PROCEDURE — 25000132 ZZH RX MED GY IP 250 OP 250 PS 637: Performed by: PSYCHIATRY & NEUROLOGY

## 2018-11-22 RX ORDER — LOPERAMIDE HCL 2 MG
2 CAPSULE ORAL 4 TIMES DAILY PRN
Status: DISCONTINUED | OUTPATIENT
Start: 2018-11-22 | End: 2018-11-26 | Stop reason: HOSPADM

## 2018-11-22 RX ORDER — FOLIC ACID 1 MG/1
1 TABLET ORAL DAILY
Status: DISCONTINUED | OUTPATIENT
Start: 2018-11-22 | End: 2018-11-26 | Stop reason: HOSPADM

## 2018-11-22 RX ORDER — MULTIPLE VITAMINS W/ MINERALS TAB 9MG-400MCG
1 TAB ORAL DAILY
Status: DISCONTINUED | OUTPATIENT
Start: 2018-11-22 | End: 2018-11-26 | Stop reason: HOSPADM

## 2018-11-22 RX ORDER — LANOLIN ALCOHOL/MO/W.PET/CERES
100 CREAM (GRAM) TOPICAL DAILY
Status: DISCONTINUED | OUTPATIENT
Start: 2018-11-22 | End: 2018-11-26 | Stop reason: HOSPADM

## 2018-11-22 RX ADMIN — DIAZEPAM 10 MG: 5 TABLET ORAL at 00:59

## 2018-11-22 RX ADMIN — DIAZEPAM 10 MG: 5 TABLET ORAL at 18:59

## 2018-11-22 RX ADMIN — DIAZEPAM 10 MG: 5 TABLET ORAL at 20:30

## 2018-11-22 RX ADMIN — HYDROXYZINE HYDROCHLORIDE 25 MG: 25 TABLET, FILM COATED ORAL at 04:19

## 2018-11-22 RX ADMIN — DIAZEPAM 10 MG: 5 TABLET ORAL at 08:15

## 2018-11-22 RX ADMIN — LOPERAMIDE HYDROCHLORIDE 2 MG: 2 CAPSULE ORAL at 21:40

## 2018-11-22 RX ADMIN — HYDROXYZINE HYDROCHLORIDE 25 MG: 25 TABLET, FILM COATED ORAL at 00:58

## 2018-11-22 RX ADMIN — ONDANSETRON 4 MG: 4 TABLET, ORALLY DISINTEGRATING ORAL at 04:19

## 2018-11-22 RX ADMIN — ONDANSETRON 4 MG: 4 TABLET, ORALLY DISINTEGRATING ORAL at 18:59

## 2018-11-22 RX ADMIN — ONDANSETRON 4 MG: 4 TABLET, ORALLY DISINTEGRATING ORAL at 11:57

## 2018-11-22 RX ADMIN — ATENOLOL 50 MG: 50 TABLET ORAL at 18:59

## 2018-11-22 RX ADMIN — NICOTINE POLACRILEX 4 MG: 4 GUM, CHEWING ORAL at 18:59

## 2018-11-22 RX ADMIN — HYDROXYZINE HYDROCHLORIDE 25 MG: 25 TABLET, FILM COATED ORAL at 20:30

## 2018-11-22 RX ADMIN — DIAZEPAM 10 MG: 5 TABLET ORAL at 11:57

## 2018-11-22 RX ADMIN — DIAZEPAM 10 MG: 5 TABLET ORAL at 16:21

## 2018-11-22 RX ADMIN — PRAZOSIN HYDROCHLORIDE 1 MG: 1 CAPSULE ORAL at 21:40

## 2018-11-22 RX ADMIN — DIAZEPAM 10 MG: 5 TABLET ORAL at 14:10

## 2018-11-22 RX ADMIN — DIAZEPAM 10 MG: 5 TABLET ORAL at 04:19

## 2018-11-22 RX ADMIN — TRAZODONE HYDROCHLORIDE 50 MG: 50 TABLET ORAL at 21:40

## 2018-11-22 ASSESSMENT — ACTIVITIES OF DAILY LIVING (ADL)
GROOMING: HANDWASHING;INDEPENDENT
DRESS: INDEPENDENT
ORAL_HYGIENE: INDEPENDENT

## 2018-11-22 NOTE — PHARMACY-ADMISSION MEDICATION HISTORY
"Admission Medication History status for the 11/20/2018 admission is complete.  See EPIC admission navigator for Prior to Admission medications.    Medication history sources:  Patient, Care Everywhere ([facility]), [facility] ([phone])     Medication history source reliability: Moderate - see below    Medication adherence:  Poor - patient reports not taking any of her medications for over a month    Changes made to PTA medication list (reason)  Added: Fluoxetine, Propranolol, Baclofen, Ziprasidone, Melatonin  Deleted:   -Bupropion HCl 300 mg daily --- patient not taking  -CoQ10 100 mg daily --- patient not taking  -Folic acid PO daily --- patient not taking  -Magnesium oxide 250 mg daily --- patient not taking  -Naltrexone 380 mg sustained release injection into the muscle once --- patient hasn't gotten in months  Changed: Vitamin B-12 500 mcg daily > Vitamin B Complex per patient, buys OTC    Additional medication history information (including reliability of information, actions taken by pharmacist):   -Patient was a good historian who named all of her meds/doses without being prompted.   -She stated that she was on Fluoxetine 40 mg daily, but per [facility] and Care Everywhere she was prescribed 20 mg daily on 9/10/18 and she never picked it up from the pharmacy. Could not verify if she filled it anywhere else. She stated her last dose was about 3 weeks ago.   -Propranolol and Baclofen doses verified by [facility], last filled 10/5/18 for 30 day supply.  -Ziprasidone dose verified by [facility], last filled March 2018 for 30 day supply.  -Patient reported she was taking Xanax 1 mg daily \"on and off\", per [facility] script was for Xanax 0.5 mg twice daily and last filled June 2018.    -Patient reported getting multivitamin, B complex and Melatonin OTC.     Time spent in this activity: 25 minutes    Medication history completed by: ROBERT Zapien    Prior to Admission medications    Medication Sig Last Dose " Taking? Auth Provider   baclofen (LIORESAL) 20 MG tablet Take 20 mg by mouth 3 times daily More than a month at Unknown time  Unknown, Entered By History   FLUoxetine (PROZAC) 20 MG capsule Take 20 mg by mouth daily Past Month at Unknown time  Unknown, Entered By History   Melatonin 10 MG TABS tablet Take 10 mg by mouth nightly as needed for sleep More than a month at Unknown time  Unknown, Entered By History   multivitamin, therapeutic with minerals (MULTI-VITAMIN) TABS Take 1 tablet by mouth daily. More than a month at Unknown time  Eleno Coronado MD   propranolol (INDERAL) 20 MG tablet Take 20 mg by mouth 2 times daily More than a month at Unknown time  Unknown, Entered By History   vitamin B-Complex Take 1 tablet by mouth daily More than a month at Unknown time  Unknown, Entered By History   ziprasidone (GEODON) 20 MG capsule Take 20 mg by mouth At Bedtime More than a month at Unknown time  Unknown, Entered By History

## 2018-11-22 NOTE — PROGRESS NOTES
Writer checked in on pt, pt reports she is feeling ill and has not been able to get out of bed. Pt reports when she is feeling better she will begin working on her paperwork for CD assessment and referral. Pt's RN was notified about pt feeling ill, RN will follow-up with administering medications for symptoms.

## 2018-11-22 NOTE — PLAN OF CARE
Problem: Substance Withdrawal  Goal: Substance Withdrawal  Signs and symptoms of listed problems will be absent or manageable.   Outcome: Improving  Pt somewhat improved this am, tolerating 50% of breakfast and lunch, tolerating fluids well. She reports some nausea and given Zofran, given Valium X2 per MSSA protocol, last MSSA 10 however BP 97/57 so valium held per orders, will reassess in 1 hr. Fluids encouraged.
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
## 2018-11-22 NOTE — PLAN OF CARE
Brief Medicine Note  November 22, 2018    K stable at 3.8 today. No need for further K supplementation. Medicine will sign off.     Airam Platt PA-C

## 2018-11-23 PROCEDURE — 12800012 ZZH R&B CD MH INTERMEDIATE ADULT

## 2018-11-23 PROCEDURE — 25000128 H RX IP 250 OP 636: Performed by: PSYCHIATRY & NEUROLOGY

## 2018-11-23 PROCEDURE — 90686 IIV4 VACC NO PRSV 0.5 ML IM: CPT | Performed by: PSYCHIATRY & NEUROLOGY

## 2018-11-23 PROCEDURE — 25000132 ZZH RX MED GY IP 250 OP 250 PS 637: Performed by: EMERGENCY MEDICINE

## 2018-11-23 PROCEDURE — 25000131 ZZH RX MED GY IP 250 OP 636 PS 637: Performed by: PSYCHIATRY & NEUROLOGY

## 2018-11-23 PROCEDURE — 25000132 ZZH RX MED GY IP 250 OP 250 PS 637: Performed by: PSYCHIATRY & NEUROLOGY

## 2018-11-23 PROCEDURE — 99232 SBSQ HOSP IP/OBS MODERATE 35: CPT | Performed by: PSYCHIATRY & NEUROLOGY

## 2018-11-23 PROCEDURE — H0001 ALCOHOL AND/OR DRUG ASSESS: HCPCS

## 2018-11-23 RX ADMIN — FOLIC ACID 1 MG: 1 TABLET ORAL at 09:28

## 2018-11-23 RX ADMIN — LOPERAMIDE HYDROCHLORIDE 2 MG: 2 CAPSULE ORAL at 16:42

## 2018-11-23 RX ADMIN — HYDROXYZINE HYDROCHLORIDE 25 MG: 25 TABLET, FILM COATED ORAL at 16:42

## 2018-11-23 RX ADMIN — PRAZOSIN HYDROCHLORIDE 1 MG: 1 CAPSULE ORAL at 20:19

## 2018-11-23 RX ADMIN — LOPERAMIDE HYDROCHLORIDE 2 MG: 2 CAPSULE ORAL at 09:30

## 2018-11-23 RX ADMIN — FLUOXETINE HYDROCHLORIDE 20 MG: 20 CAPSULE ORAL at 09:28

## 2018-11-23 RX ADMIN — HYDROXYZINE HYDROCHLORIDE 25 MG: 25 TABLET, FILM COATED ORAL at 09:30

## 2018-11-23 RX ADMIN — ONDANSETRON 4 MG: 4 TABLET, ORALLY DISINTEGRATING ORAL at 01:05

## 2018-11-23 RX ADMIN — DIAZEPAM 10 MG: 5 TABLET ORAL at 01:05

## 2018-11-23 RX ADMIN — INFLUENZA A VIRUS A/MICHIGAN/45/2015 X-275 (H1N1) ANTIGEN (FORMALDEHYDE INACTIVATED), INFLUENZA A VIRUS A/SINGAPORE/INFIMH-16-0019/2016 IVR-186 (H3N2) ANTIGEN (FORMALDEHYDE INACTIVATED), INFLUENZA B VIRUS B/PHUKET/3073/2013 ANTIGEN (FORMALDEHYDE INACTIVATED), AND INFLUENZA B VIRUS B/MARYLAND/15/2016 BX-69A ANTIGEN (FORMALDEHYDE INACTIVATED) 0.5 ML: 15; 15; 15; 15 INJECTION, SUSPENSION INTRAMUSCULAR at 16:42

## 2018-11-23 RX ADMIN — HYDROXYZINE HYDROCHLORIDE 25 MG: 25 TABLET, FILM COATED ORAL at 20:19

## 2018-11-23 RX ADMIN — MELATONIN 5 MG TABLET 5 MG: at 20:19

## 2018-11-23 RX ADMIN — Medication 100 MG: at 09:27

## 2018-11-23 RX ADMIN — DIAZEPAM 10 MG: 5 TABLET ORAL at 05:22

## 2018-11-23 RX ADMIN — MULTIPLE VITAMINS W/ MINERALS TAB 1 TABLET: TAB at 09:28

## 2018-11-23 RX ADMIN — TRAZODONE HYDROCHLORIDE 50 MG: 50 TABLET ORAL at 20:18
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
## 2018-11-23 NOTE — PROGRESS NOTES
Patient was reporting diarrhea and prn loperamide ordered. Patient also scored 14, 15, and 14 on MSSA this shift and received 30 mg of valium this shift. Had prn hydroxyzine 25 mg for anxiety as well. Will continue to monitor.

## 2018-11-23 NOTE — PROGRESS NOTES
Met with Pt and completed assessment.  Pt signed EGLY's and clinical was faxed for review to [facility] [address] [address].  Pt will need [facility] funding for treatment.  WIll submit for funding once placement is determined.

## 2018-11-23 NOTE — PROGRESS NOTES
"Pt. Admitted 11/21 for alcohol withdrawal.  Pt. Last MSSA 11/9.  Pt. Still indicating she is having severe withdrawal symptoms.  However her stated symptoms don't match her behaviors.  Pt states \"i'm not hungry, too sick,' but then asks for cookies and juice, or \"I am too sick to get out of be,\" then asks for her make-up and grabs a snack. Pt often scoring +8 as a result of HR. Pt. Hr  on night shift. Pt indicated that she typically has a high heart rate, above 100 even when not in detox from ETOH..     "
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
## 2018-11-23 NOTE — PROGRESS NOTES
"Pt has received 200 mg of valium this admission and MSSA this am 4, not requiring additional medication. She has been up on the unit working on her CD assessment paperwork.  She ate 100% of her breakfast and requested additional snacks.  She reports feeling tried and anxious 6/10, \"situational depression,\" denies SI. She was given vistaril for anxiety. /72  Pulse 84  Temp 98.3  F (36.8  C) (Oral)  Resp 16  Ht 1.6 m (5' 3\")  Wt 69.9 kg (154 lb)  SpO2 99%  BMI 27.28 kg/m2\. Pt has been tachycardiac off and on the past 3 days, reports that she normally has a high pulse. Her current HR is 84.  Spoke with  and if HR continues to be elevated should consult with internal medicine. I previous spoke with internal medicine and atenolol was ordered.  Will continue to monitor and given PRN atenolol as needed, informed internal medicine.   "

## 2018-11-24 PROCEDURE — 12800012 ZZH R&B CD MH INTERMEDIATE ADULT

## 2018-11-24 PROCEDURE — 25000132 ZZH RX MED GY IP 250 OP 250 PS 637: Performed by: PSYCHIATRY & NEUROLOGY

## 2018-11-24 PROCEDURE — 25000132 ZZH RX MED GY IP 250 OP 250 PS 637: Performed by: EMERGENCY MEDICINE

## 2018-11-24 PROCEDURE — 25000132 ZZH RX MED GY IP 250 OP 250 PS 637: Performed by: PHYSICIAN ASSISTANT

## 2018-11-24 RX ADMIN — FOLIC ACID 1 MG: 1 TABLET ORAL at 09:13

## 2018-11-24 RX ADMIN — TRAZODONE HYDROCHLORIDE 50 MG: 50 TABLET ORAL at 21:34

## 2018-11-24 RX ADMIN — HYDROXYZINE HYDROCHLORIDE 25 MG: 25 TABLET, FILM COATED ORAL at 21:33

## 2018-11-24 RX ADMIN — HYDROXYZINE HYDROCHLORIDE 25 MG: 25 TABLET, FILM COATED ORAL at 09:20

## 2018-11-24 RX ADMIN — PRAZOSIN HYDROCHLORIDE 1 MG: 1 CAPSULE ORAL at 21:33

## 2018-11-24 RX ADMIN — FLUOXETINE HYDROCHLORIDE 20 MG: 20 CAPSULE ORAL at 09:12

## 2018-11-24 RX ADMIN — HYDROXYZINE HYDROCHLORIDE 25 MG: 25 TABLET, FILM COATED ORAL at 16:21

## 2018-11-24 RX ADMIN — ACETAMINOPHEN 650 MG: 325 TABLET, FILM COATED ORAL at 09:20

## 2018-11-24 RX ADMIN — ATENOLOL 50 MG: 50 TABLET ORAL at 06:06

## 2018-11-24 RX ADMIN — HYDROXYZINE HYDROCHLORIDE 25 MG: 25 TABLET, FILM COATED ORAL at 06:06

## 2018-11-24 RX ADMIN — MELATONIN 5 MG TABLET 10 MG: at 19:53

## 2018-11-24 RX ADMIN — Medication 100 MG: at 09:12

## 2018-11-24 RX ADMIN — MULTIPLE VITAMINS W/ MINERALS TAB 1 TABLET: TAB at 09:13

## 2018-11-24 ASSESSMENT — ACTIVITIES OF DAILY LIVING (ADL)
GROOMING: HANDWASHING;INDEPENDENT
DRESS: STREET CLOTHES;INDEPENDENT
ORAL_HYGIENE: INDEPENDENT

## 2018-11-24 NOTE — PROGRESS NOTES
Pt assessed for alcohol withdrawal symptoms. Pt endorsed anxiety. Denied all other withdrawal symptoms. VS: /72; P119; R 18; T 97.8. MSSA=8 due to elevated pulse rate. Atenolol 50mg and vistaril 25mg were administered at 0606. Continue to monitor.

## 2018-11-25 PROCEDURE — 25000132 ZZH RX MED GY IP 250 OP 250 PS 637: Performed by: PSYCHIATRY & NEUROLOGY

## 2018-11-25 PROCEDURE — 12800012 ZZH R&B CD MH INTERMEDIATE ADULT

## 2018-11-25 PROCEDURE — 25000132 ZZH RX MED GY IP 250 OP 250 PS 637: Performed by: EMERGENCY MEDICINE

## 2018-11-25 PROCEDURE — 25000132 ZZH RX MED GY IP 250 OP 250 PS 637: Performed by: PHYSICIAN ASSISTANT

## 2018-11-25 RX ADMIN — Medication 100 MG: at 08:39

## 2018-11-25 RX ADMIN — FLUOXETINE HYDROCHLORIDE 20 MG: 20 CAPSULE ORAL at 08:39

## 2018-11-25 RX ADMIN — MELATONIN 5 MG TABLET 10 MG: at 20:08

## 2018-11-25 RX ADMIN — FOLIC ACID 1 MG: 1 TABLET ORAL at 08:39

## 2018-11-25 RX ADMIN — TRAZODONE HYDROCHLORIDE 50 MG: 50 TABLET ORAL at 22:23

## 2018-11-25 RX ADMIN — NICOTINE POLACRILEX 4 MG: 4 GUM, CHEWING ORAL at 20:08

## 2018-11-25 RX ADMIN — PRAZOSIN HYDROCHLORIDE 1 MG: 1 CAPSULE ORAL at 22:23

## 2018-11-25 RX ADMIN — HYDROXYZINE HYDROCHLORIDE 25 MG: 25 TABLET, FILM COATED ORAL at 08:39

## 2018-11-25 RX ADMIN — NICOTINE POLACRILEX 4 MG: 4 GUM, CHEWING ORAL at 08:39

## 2018-11-25 RX ADMIN — ATENOLOL 50 MG: 50 TABLET ORAL at 12:26

## 2018-11-25 RX ADMIN — MULTIPLE VITAMINS W/ MINERALS TAB 1 TABLET: TAB at 08:39

## 2018-11-25 RX ADMIN — HYDROXYZINE HYDROCHLORIDE 25 MG: 25 TABLET, FILM COATED ORAL at 22:23

## 2018-11-25 ASSESSMENT — ACTIVITIES OF DAILY LIVING (ADL)
DRESS: STREET CLOTHES;INDEPENDENT
ORAL_HYGIENE: INDEPENDENT
GROOMING: HANDWASHING;INDEPENDENT
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
## 2018-11-25 NOTE — PROGRESS NOTES
Patient has not required valium for over 24 hours. Patient is out of detox for alcohol withdrawal.

## 2018-11-26 VITALS
WEIGHT: 154 LBS | SYSTOLIC BLOOD PRESSURE: 98 MMHG | DIASTOLIC BLOOD PRESSURE: 65 MMHG | HEART RATE: 107 BPM | TEMPERATURE: 97 F | OXYGEN SATURATION: 99 % | HEIGHT: 63 IN | BODY MASS INDEX: 27.29 KG/M2 | RESPIRATION RATE: 16 BRPM

## 2018-11-26 PROCEDURE — 25000132 ZZH RX MED GY IP 250 OP 250 PS 637: Performed by: PSYCHIATRY & NEUROLOGY

## 2018-11-26 PROCEDURE — 25000132 ZZH RX MED GY IP 250 OP 250 PS 637: Performed by: EMERGENCY MEDICINE

## 2018-11-26 PROCEDURE — 99239 HOSP IP/OBS DSCHRG MGMT >30: CPT | Performed by: PSYCHIATRY & NEUROLOGY

## 2018-11-26 RX ORDER — MULTIPLE VITAMINS W/ MINERALS TAB 9MG-400MCG
1 TAB ORAL DAILY
Qty: 30 EACH | Refills: 0 | Status: ON HOLD | OUTPATIENT
Start: 2018-11-27 | End: 2019-04-07

## 2018-11-26 RX ORDER — DISULFIRAM 250 MG/1
250 TABLET ORAL DAILY
Qty: 30 TABLET | Refills: 0 | Status: SHIPPED | OUTPATIENT
Start: 2018-11-26 | End: 2019-04-04

## 2018-11-26 RX ORDER — FLUOXETINE 40 MG/1
40 CAPSULE ORAL DAILY
Qty: 30 CAPSULE | Refills: 0 | Status: ON HOLD | OUTPATIENT
Start: 2018-11-26 | End: 2019-04-07

## 2018-11-26 RX ORDER — PRAZOSIN HYDROCHLORIDE 1 MG/1
1 CAPSULE ORAL AT BEDTIME
Qty: 30 CAPSULE | Refills: 0 | Status: ON HOLD | OUTPATIENT
Start: 2018-11-26 | End: 2019-04-07

## 2018-11-26 RX ORDER — LANOLIN ALCOHOL/MO/W.PET/CERES
100 CREAM (GRAM) TOPICAL DAILY
Qty: 30 TABLET | Refills: 0 | Status: SHIPPED | OUTPATIENT
Start: 2018-11-27 | End: 2019-04-04

## 2018-11-26 RX ADMIN — NICOTINE POLACRILEX 4 MG: 4 GUM, CHEWING ORAL at 10:41

## 2018-11-26 RX ADMIN — MULTIPLE VITAMINS W/ MINERALS TAB 1 TABLET: TAB at 08:35

## 2018-11-26 RX ADMIN — FLUOXETINE HYDROCHLORIDE 20 MG: 20 CAPSULE ORAL at 08:35

## 2018-11-26 RX ADMIN — HYDROXYZINE HYDROCHLORIDE 25 MG: 25 TABLET, FILM COATED ORAL at 08:37

## 2018-11-26 RX ADMIN — FOLIC ACID 1 MG: 1 TABLET ORAL at 08:35

## 2018-11-26 RX ADMIN — Medication 100 MG: at 08:35

## 2018-11-26 ASSESSMENT — ACTIVITIES OF DAILY LIVING (ADL)
GROOMING: INDEPENDENT
ORAL_HYGIENE: INDEPENDENT

## 2018-11-26 NOTE — PROGRESS NOTES
"Pt discharged to \"a friends house\" with all belongings and medications.  Acknowledged understanding of all discharge instructions. Transported by self on public transportation \"cab.\" Walked off unit by Uvaldo OCHOA.    "

## 2018-11-26 NOTE — PROVIDER NOTIFICATION
Spoke with Airam Platt regarding elevated pulse. Continue with PRN Atenolol and f/u will PCP. Pt reports that she currently does not have insurance but will talk with business office today to apply for MA.  She will schedule a f/U with [facility] [employer] [place] [facility] #[phone] when able..

## 2018-11-26 NOTE — DISCHARGE INSTRUCTIONS
Behavioral Discharge Planning and Instructions  THANK YOU FOR CHOOSING THE [address]  [phone]    Summary: You were admitted to [facility] on 11/20/18 for detoxification from Alcohol.  A medical exam was performed that included lab work. You have met with a  and opted to follow-up with residential treatment referral from home.  Please take care and make your recovery a priority!    Main Diagnosis:  Per  Dr. Recinos/Naomi  Alcohol use disorder, severe.   Borderline personality disorder,     Major depressive disorder, recurrent with non-psychotic features.       Major Treatments, Procedures and Findings:  You were detoxed from alcohol. You have met with a  to develop a treatment plan for discharge.  You have had labs drawn and a copy of those labs will be sent home with you. Your alcohol withdrawal is complete and you are being discharged today.  Please bring your lab results with to your follow up doctor appointment.  Make your recovery a priority!                        Symptoms to Report:  If you experience more anxiety, confusion, sleeplessness, deep sadness or thoughts of suicide, notify your treatment team or notify your primary care physician. IF ANY OF THE SYMPTOMS YOU ARE EXPERIENCING ARE A MEDICAL EMERGENCY CALL 911 IMMEDIATELY.     Lifestyle Adjustment: Adjust your lifestyle to get enough sleep, relaxation, exercise and  good nutrition. Continue to develop healthy coping skills to decrease stress and promote a sober living environment. Do not use alcohol, illegal drugs or addictive medications other than what is currently prescribed. AA, NA, and  Sponsor are excellent resources for support.     Disposition: Home    Primary Provider:  Dr. Allen Chavez  [facility]  [address] [place] [address]  Appointment: [facility] pending MA approval    Treatment Follow-Up:  Follow-up with [facility] at [phone] to verify you have been  approved for funding at [facility].    [facility]  [phone]  When funding has been approved by [facility], please contact [facility] to schedule admission date and time.    Resources:     [facility] [phone]  Support Group:  AA/NA and Sponsor/support  Crisis Intervention: 251.836.1071 or 937-971-7148 (TTY: 680.921.6088).  Call anytime for help.  National [place] on Mental Illness (www.[place].org): 291.628.8246 or 675-616-1437.  Alcoholics Anonymous (www.alcoholics-anonymous.org): Check your phone book for your local chapter.  Suicide Awareness Voices of Education (SAVE) (www.save.org): 974-655-FROV (4614)  National Suicide Prevention Line (www.mentalhealthmn.org): 818-904-VTGQ (8972)  Mental Health Consumer/Survivor Network of [place] (www.mhcsn.net): 742.382.7593 or 543-237-6302  Mental Health Association of [place] (www.mentalhealth.org): 371.634.5003 or 892-895-9642   Substance Abuse and Mental Health Services (www.samhsa.gov)    [place] [facility] ([facility])  [facility] connects people seeking recovery to resources that help foster and sustain long-term recovery.  Whether you are seeking resources for treatment, transportation, housing, job training, education, health care or other pathways to recovery, [facility] is a great place to start.  541.529.5153.  Www.[place]arecovery.org    General Medication Instructions:   See your medication sheet(s) for instructions.   Take all medicines as directed.  Make no changes unless your doctor suggests them.   Go to all your doctor visits.  Be sure to have all your required lab tests. This way, your medicines can be refilled on time.  Do not use any drugs not prescribed by your provider.  AA/NA and Sponsors are excellent resources for support  Avoid alcohol.    Please Note:  If you have any questions at anytime after you are discharged please call the [facility] [place] detox unit 3AW unit at [phone].    [facility]  [facility], Behavioral Intake [phone]    Please take this discharge folder with you to all your follow up appointments, it contains your lab results, diagnosis, medication list and discharge recommendations.      THANK YOU FOR CHOOSING THE [facility]

## 2018-11-26 NOTE — PROGRESS NOTES
Writer met with pt to discuss [facility] funding application. Pt reports her permanent address is [facility] and prefers to request funding through [facility] despite staying in a hotel room for 10 days in [place] prior to coming to detox. Writer informed financial counseling office for MA application through [facility]. Pt signed paperwork for Rule 25 funding and writer faxed to [facility] ([phone]) requesting treatment at [facility]. CD assessment was faxed to [facility] on 11/23 by Thu Fletcher. Pt reports she would like to discharge the hospital to see her son who is in town. Pt states she can follow-up with treatment placement from home. Pt was encouraged to speak with her physician about this request.  Waiting to hear back from [facility] regarding funding and placement. Writer placed call to [facility] to verify assessment was received. Spoke with Jojo in admission who reports there is immediate availability and assessment is being reviewed.  CM continues    Update: Pt will be discharged today after her MA application. Follow-up information for treatment admission added to AVS.

## 2018-11-26 NOTE — DISCHARGE SUMMARY
Admit Date:     11/20/2018   Discharge Date:           More than 35 minutes spent on discharge summary, doing the discharge instructions, discharge medications, discharge mental status examination.        DISCHARGE DIAGNOSES:   AXIS I:  Alcohol use disorder, severe; Major depressive recurrence, with psychotic features.   Please review detailed admission note by Dr. Riley on 11/21/2018.      During hospitalization, the patient was detoxed off alcohol or MSSA protocol and Valium.  She was started on Prozac titrated to 40 mg.  She had an uneventful detox.  She had lab work done, which is normal.  Comprehensive metabolic panel.  Potassium normalized.  Protein is 6.1.  ALT is 74, AST is 170, WBC is 2.8, platelet count is 52.  The patient needs to do treatment; however, she does not want to stay.  She says she is not suicidal, she is not homicidal.  She wants to be discharged because her son is visiting.  She met with Internal Medicine consult.  Please review detailed note by Airam Platt on 11/21/2018.  The patient was seen with  and patient wants to stay with a friend and visit her son.  She completed detox and she wants to go to the [facility] whenever there is a bed available.  She is not holdable.  She is not suicidal, she is not homicidal, but the patient had labs drawn and she has a poor prognosis if she relapses.      DISCHARGE DISPOSITION:  The patient going home.      DISCHARGE MENTAL STATUS EXAMINATION:  The patient is alert, oriented x3.  Recent and remote memory, language, and fund of knowledge are all good.  No loose associations.  The patient does not have any active suicidal or homicidal ideation, plan or intent.  The patient is stable to be discharged.               DISCHARGE MENTAL STATUS EXAMINATION:  The patient is alert, oriented x3.  Good fund of knowledge.  Good use of language.  Recent and remote memory, language, fund of knowledge are all adequate.  Euthymic mood congruent affect   Speech normal rate/rhythm linear tp no loose asso,The patient does not have any active suicidal or homicidal ideation.  Does not have any auditory or visual hallucination.  Fair insight/judgment At this time, the patient was stable to be discharged.        Pt was not determined to not be a danger to himself or others. At the current time of discharge, the patient does not meet criteria for involuntary hospitalization. On the day of discharge, the patient reports that they do not have suicidal or homicidal ideation and would never hurt themselves or others. Steps taken to minimize risk include: assessing patient s behavior and thought process daily during hospital stay, discharging patient with adequate plan for follow up for mental and physical health and discussing safety plan of returning to the hospital should the patient ever have thoughts of harming themselves or others. Therefore, based on all available evidence including the factors cited above, the patient does not appear to be at imminent risk for self-harm, and is appropriate for outpatient level of care.     Educated about side effects/risk vs benefits /alternative including non treatment.Pt consented to be on medication.     .Total time spent on discharge summary more than 35 min  More than  20 min  planning, coordination of care, medication reconciliation and performance of physical exam on day of discharge.Care was coordinated with unit RN and unit therapist       Pako Miller Medication Instructions CRISTIN:[id number]    Printed on:11/27/18 4257   Medication Information                      disulfiram (ANTABUSE) 250 MG tablet  Take 1 tablet (250 mg) by mouth daily             FLUoxetine (PROZAC) 40 MG capsule  Take 1 capsule (40 mg) by mouth daily             melatonin 5 MG tablet  Take 1-2 tablets (5-10 mg) by mouth nightly as needed for sleep             multivitamin, therapeutic with minerals (THERA-VIT-M) TABS tablet  Take 1 tablet by  mouth daily             prazosin (MINIPRESS) 1 MG capsule  Take 1 capsule (1 mg) by mouth At Bedtime             vitamin B1 (THIAMINE) 100 MG tablet  Take 1 tablet (100 mg) by mouth daily                     DISCHARGE MENTAL STATUS EXAMINATION:  The patient is alert, oriented x3.  Good fund of knowledge.  Good use of language.  Recent and remote memory, language, fund of knowledge are all adequate.  Euthymic mood congruent affect  Speech normal rate/rhythm linear tp no loose asso,The patient does not have any active suicidal or homicidal ideation.  Does not have any auditory or visual hallucination.  Fair insight/judgment At this time, the patient was stable to be discharged.        Pt was not determined to not be a danger to himself or others. At the current time of discharge, the patient does not meet criteria for involuntary hospitalization. On the day of discharge, the patient reports that they do not have suicidal or homicidal ideation and would never hurt themselves or others. Steps taken to minimize risk include: assessing patient s behavior and thought process daily during hospital stay, discharging patient with adequate plan for follow up for mental and physical health and discussing safety plan of returning to the hospital should the patient ever have thoughts of harming themselves or others. Therefore, based on all available evidence including the factors cited above, the patient does not appear to be at imminent risk for self-harm, and is appropriate for outpatient level of care.     Educated about side effects/risk vs benefits /alternative including non treatment.Pt consented to be on medication.     .Total time spent on discharge summary more than 35 min  More than  20 min  planning, coordination of care, medication reconciliation and performance of physical exam on day of discharge.Care was coordinated with unit RN and unit therapist  Disposition: Home     Primary Provider:  Dr. Villa  [facility]  [address] [place] [address]  Appointment: TBD pending [place] approval     Treatment Follow-Up:  Follow-up with [facility] at [phone] to verify you have been approved for funding at [facility].     [facility]  [phone]  When funding has been approved by [facility], please contact [facility] to schedule admission date and time.     Resources:      [facility] [phone]  Support Group:  AA/NA and Sponsor/support  Crisis Intervention: 745.196.5042 or 476-302-5964 (TTY: 408.936.7131).  Call anytime for help.  National [place] on Mental Illness (www.[place].org): 293.512.3175 or 645-338-6106.  Alcoholics Anonymous (www.alcoholics-anonymous.org): Check your phone book for your local chapter.  Suicide Awareness Voices of Education (SAVE) (www.save.org): 129-428-IZEN (5883)  National Suicide Prevention Line (www.mentalhealthmn.org): 167-655-BEXX (4676)  Mental Health Consumer/Survivor Network of [place] (www.mhcsn.net): 461.305.6978 or 294-964-3611  Mental Health Association of [place] (www.mentalhealth.org): 619.933.1885 or 531-670-3030   Substance Abuse and Mental Health Services (www.samhsa.gov)     [place] [facility] ([facility])  [facility] connects people seeking recovery to resources that help foster and sustain long-term recovery.  Whether you are seeking resources for treatment, transportation, housing, job training, education, health care or other pathways to recovery, [facility] is a great place to start.  585.597.9740.  Www.[place]otarecovery.org     General Medication Instructions:   See your medication sheet(s) for instructions.   Take all medicines as directed.  Make no changes unless your doctor suggests them.   Go to all your doctor visits.  Be sure to have all your required lab tests. This way, your medicines can be refilled on time.  Do not use any drugs not prescribed by your provider.  AA/NA and Sponsors are excellent resources for support  Avoid  alcohol.         KT MOONEY MD             D: 2018   T: 2018   MT: PRAKASH      Name:     GIRISH ZELAYA   MRN:      [id number]        Account:        [id number]   :      1975           Admit Date:     2018                                  Discharge Date:       Document: [id number]       cc: Allen Chavez MD
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
## 2019-04-03 ENCOUNTER — HOSPITAL ENCOUNTER (EMERGENCY)
Facility: CLINIC | Age: 44
Discharge: HOME | End: 2019-04-03
Attending: EMERGENCY MEDICINE | Admitting: EMERGENCY MEDICINE
Payer: COMMERCIAL

## 2019-04-03 VITALS
WEIGHT: 152.6 LBS | BODY MASS INDEX: 27.03 KG/M2 | HEART RATE: 134 BPM | RESPIRATION RATE: 16 BRPM | OXYGEN SATURATION: 95 % | DIASTOLIC BLOOD PRESSURE: 83 MMHG | SYSTOLIC BLOOD PRESSURE: 116 MMHG | TEMPERATURE: 98.2 F

## 2019-04-03 DIAGNOSIS — F10.920 ALCOHOLIC INTOXICATION WITHOUT COMPLICATION (H): ICD-10-CM

## 2019-04-03 LAB — ALCOHOL BREATH TEST: 0.37 (ref 0–0.01)

## 2019-04-03 PROCEDURE — 99283 EMERGENCY DEPT VISIT LOW MDM: CPT | Performed by: EMERGENCY MEDICINE

## 2019-04-03 PROCEDURE — 99284 EMERGENCY DEPT VISIT MOD MDM: CPT | Mod: Z6 | Performed by: EMERGENCY MEDICINE

## 2019-04-03 PROCEDURE — 82075 ASSAY OF BREATH ETHANOL: CPT

## 2019-04-03 RX ORDER — CHLORDIAZEPOXIDE HYDROCHLORIDE 25 MG/1
50 CAPSULE, GELATIN COATED ORAL 3 TIMES DAILY PRN
Qty: 20 CAPSULE | Refills: 0 | Status: SHIPPED | OUTPATIENT
Start: 2019-04-03 | End: 2019-04-04

## 2019-04-03 RX ORDER — PROPRANOLOL HYDROCHLORIDE 20 MG/1
20 TABLET ORAL 2 TIMES DAILY
Qty: 60 TABLET | Refills: 0 | Status: ON HOLD | OUTPATIENT
Start: 2019-04-03 | End: 2019-04-07

## 2019-04-03 RX ORDER — ONDANSETRON 4 MG/1
4 TABLET, ORALLY DISINTEGRATING ORAL EVERY 8 HOURS PRN
Qty: 10 TABLET | Refills: 0 | Status: SHIPPED | OUTPATIENT
Start: 2019-04-03 | End: 2019-04-04

## 2019-04-03 NOTE — DISCHARGE INSTRUCTIONS
Please make an appointment to follow up with Your Primary Care Provider as soon as possible.  There are no detox beds available at this time at [facility]  You may call the following numbers tomorrow to check on bed availability:    [facility]    [phone]  [address] [place] [address] Detox         [phone]  [facility] Detox        [phone]

## 2019-04-03 NOTE — ED TRIAGE NOTES
Patient presents to ED with friend with request for detox from ETOH. Patient drinks daily. Patient drinks 2 pints daily. Seizure hx r/t ETOH withdrawal. Patient reports having seizure yesterday. Denies other drug use.

## 2019-04-03 NOTE — ED AVS SNAPSHOT
[facility], [place], Emergency Department  [address]  [facility] [address]  Phone:  [phone]  Fax:  [phone]                                    Pako Miller   MRN: [id number]    Department:  [facility], Emergency Department   Date of Visit:  4/3/2019           After Visit Summary Signature Page    I have received my discharge instructions, and my questions have been answered. I have discussed any challenges I see with this plan with the nurse or doctor.    ..........................................................................................................................................  Patient/Patient Representative Signature      ..........................................................................................................................................  Patient Representative Print Name and Relationship to Patient    ..................................................               ................................................  Date                                   Time    ..........................................................................................................................................  Reviewed by Signature/Title    ...................................................              ..............................................  Date                                               Time          22EPIC Rev 08/18

## 2019-04-04 ENCOUNTER — HOSPITAL ENCOUNTER (INPATIENT)
Facility: CLINIC | Age: 44
LOS: 3 days | Discharge: HOME | End: 2019-04-07
Attending: EMERGENCY MEDICINE | Admitting: INTERNAL MEDICINE
Payer: COMMERCIAL

## 2019-04-04 DIAGNOSIS — F10.920 ALCOHOL INTOXICATION, UNCOMPLICATED (H): ICD-10-CM

## 2019-04-04 DIAGNOSIS — F32.2 MAJOR DEPRESSIVE DISORDER, SINGLE EPISODE, SEVERE (H): ICD-10-CM

## 2019-04-04 DIAGNOSIS — F10.20 ALCOHOL USE DISORDER, SEVERE, DEPENDENCE (H): Primary | ICD-10-CM

## 2019-04-04 DIAGNOSIS — F10.220 ALCOHOL DEPENDENCE WITH UNCOMPLICATED INTOXICATION (H): ICD-10-CM

## 2019-04-04 DIAGNOSIS — F10.10 ALCOHOL ABUSE: ICD-10-CM

## 2019-04-04 DIAGNOSIS — F10.930 ALCOHOL WITHDRAWAL, UNCOMPLICATED (H): ICD-10-CM

## 2019-04-04 DIAGNOSIS — F10.229 ACUTE ALCOHOLIC INTOXICATION IN ALCOHOLISM WITH COMPLICATION (H): ICD-10-CM

## 2019-04-04 DIAGNOSIS — F41.1 GENERALIZED ANXIETY DISORDER: ICD-10-CM

## 2019-04-04 LAB
ALBUMIN SERPL-MCNC: 4 G/DL (ref 3.4–5)
ALCOHOL BREATH TEST: 0.38 (ref 0–0.01)
ALP SERPL-CCNC: 120 U/L (ref 40–150)
ALT SERPL W P-5'-P-CCNC: 27 U/L (ref 0–50)
AMPHETAMINES UR QL SCN: NEGATIVE
ANION GAP SERPL CALCULATED.3IONS-SCNC: 13 MMOL/L (ref 3–14)
AST SERPL W P-5'-P-CCNC: 50 U/L (ref 0–45)
BARBITURATES UR QL: NEGATIVE
BASOPHILS # BLD AUTO: 0 10E9/L (ref 0–0.2)
BASOPHILS NFR BLD AUTO: 0.5 %
BENZODIAZ UR QL: NEGATIVE
BILIRUB SERPL-MCNC: 0.6 MG/DL (ref 0.2–1.3)
BUN SERPL-MCNC: 11 MG/DL (ref 7–30)
CALCIUM SERPL-MCNC: 8.6 MG/DL (ref 8.5–10.1)
CANNABINOIDS UR QL SCN: NEGATIVE
CHLORIDE SERPL-SCNC: 101 MMOL/L (ref 94–109)
CO2 SERPL-SCNC: 28 MMOL/L (ref 20–32)
COCAINE UR QL: NEGATIVE
CREAT SERPL-MCNC: 0.48 MG/DL (ref 0.52–1.04)
DIFFERENTIAL METHOD BLD: ABNORMAL
EOSINOPHIL # BLD AUTO: 0 10E9/L (ref 0–0.7)
EOSINOPHIL NFR BLD AUTO: 0.7 %
ERYTHROCYTE [DISTWIDTH] IN BLOOD BY AUTOMATED COUNT: 14.6 % (ref 10–15)
ETHANOL UR QL SCN: POSITIVE
GFR SERPL CREATININE-BSD FRML MDRD: >90 ML/MIN/{1.73_M2}
GLUCOSE SERPL-MCNC: 103 MG/DL (ref 70–99)
HCG UR QL: NEGATIVE
HCT VFR BLD AUTO: 47.9 % (ref 35–47)
HGB BLD-MCNC: 16.3 G/DL (ref 11.7–15.7)
IMM GRANULOCYTES # BLD: 0 10E9/L (ref 0–0.4)
IMM GRANULOCYTES NFR BLD: 0 %
LYMPHOCYTES # BLD AUTO: 1.6 10E9/L (ref 0.8–5.3)
LYMPHOCYTES NFR BLD AUTO: 36.2 %
MCH RBC QN AUTO: 31 PG (ref 26.5–33)
MCHC RBC AUTO-ENTMCNC: 34 G/DL (ref 31.5–36.5)
MCV RBC AUTO: 91 FL (ref 78–100)
MONOCYTES # BLD AUTO: 0.4 10E9/L (ref 0–1.3)
MONOCYTES NFR BLD AUTO: 8.8 %
NEUTROPHILS # BLD AUTO: 2.3 10E9/L (ref 1.6–8.3)
NEUTROPHILS NFR BLD AUTO: 53.8 %
NRBC # BLD AUTO: 0 10*3/UL
NRBC BLD AUTO-RTO: 0 /100
OPIATES UR QL SCN: NEGATIVE
PLATELET # BLD AUTO: 88 10E9/L (ref 150–450)
POTASSIUM SERPL-SCNC: 3.4 MMOL/L (ref 3.4–5.3)
PROT SERPL-MCNC: 8.2 G/DL (ref 6.8–8.8)
RBC # BLD AUTO: 5.26 10E12/L (ref 3.8–5.2)
SODIUM SERPL-SCNC: 142 MMOL/L (ref 133–144)
TSH SERPL DL<=0.005 MIU/L-ACNC: 0.81 MU/L (ref 0.4–4)
WBC # BLD AUTO: 4.3 10E9/L (ref 4–11)

## 2019-04-04 PROCEDURE — 86706 HEP B SURFACE ANTIBODY: CPT | Performed by: EMERGENCY MEDICINE

## 2019-04-04 PROCEDURE — 96361 HYDRATE IV INFUSION ADD-ON: CPT | Performed by: EMERGENCY MEDICINE

## 2019-04-04 PROCEDURE — 36415 COLL VENOUS BLD VENIPUNCTURE: CPT | Performed by: EMERGENCY MEDICINE

## 2019-04-04 PROCEDURE — 96376 TX/PRO/DX INJ SAME DRUG ADON: CPT | Performed by: EMERGENCY MEDICINE

## 2019-04-04 PROCEDURE — 84443 ASSAY THYROID STIM HORMONE: CPT | Performed by: EMERGENCY MEDICINE

## 2019-04-04 PROCEDURE — 99285 EMERGENCY DEPT VISIT HI MDM: CPT | Mod: Z6 | Performed by: EMERGENCY MEDICINE

## 2019-04-04 PROCEDURE — 80320 DRUG SCREEN QUANTALCOHOLS: CPT | Performed by: EMERGENCY MEDICINE

## 2019-04-04 PROCEDURE — 25000125 ZZHC RX 250: Performed by: EMERGENCY MEDICINE

## 2019-04-04 PROCEDURE — HZ2ZZZZ DETOXIFICATION SERVICES FOR SUBSTANCE ABUSE TREATMENT: ICD-10-PCS | Performed by: INTERNAL MEDICINE

## 2019-04-04 PROCEDURE — 96365 THER/PROPH/DIAG IV INF INIT: CPT | Performed by: EMERGENCY MEDICINE

## 2019-04-04 PROCEDURE — 25000132 ZZH RX MED GY IP 250 OP 250 PS 637: Performed by: EMERGENCY MEDICINE

## 2019-04-04 PROCEDURE — 86803 HEPATITIS C AB TEST: CPT | Performed by: EMERGENCY MEDICINE

## 2019-04-04 PROCEDURE — 85025 COMPLETE CBC W/AUTO DIFF WBC: CPT | Performed by: EMERGENCY MEDICINE

## 2019-04-04 PROCEDURE — 25800030 ZZH RX IP 258 OP 636

## 2019-04-04 PROCEDURE — 99207 ZZC MOONLIGHTING INDICATOR: CPT | Performed by: INTERNAL MEDICINE

## 2019-04-04 PROCEDURE — 82075 ASSAY OF BREATH ETHANOL: CPT | Performed by: EMERGENCY MEDICINE

## 2019-04-04 PROCEDURE — 81025 URINE PREGNANCY TEST: CPT | Performed by: EMERGENCY MEDICINE

## 2019-04-04 PROCEDURE — 80053 COMPREHEN METABOLIC PANEL: CPT | Performed by: EMERGENCY MEDICINE

## 2019-04-04 PROCEDURE — 25800025 ZZH RX 258: Performed by: EMERGENCY MEDICINE

## 2019-04-04 PROCEDURE — 25000128 H RX IP 250 OP 636: Performed by: EMERGENCY MEDICINE

## 2019-04-04 PROCEDURE — 12000001 ZZH R&B MED SURG/OB UMMC

## 2019-04-04 PROCEDURE — 87389 HIV-1 AG W/HIV-1&-2 AB AG IA: CPT | Performed by: EMERGENCY MEDICINE

## 2019-04-04 PROCEDURE — 87340 HEPATITIS B SURFACE AG IA: CPT | Performed by: EMERGENCY MEDICINE

## 2019-04-04 PROCEDURE — 96375 TX/PRO/DX INJ NEW DRUG ADDON: CPT | Performed by: EMERGENCY MEDICINE

## 2019-04-04 PROCEDURE — 99285 EMERGENCY DEPT VISIT HI MDM: CPT | Mod: 25 | Performed by: EMERGENCY MEDICINE

## 2019-04-04 PROCEDURE — 80307 DRUG TEST PRSMV CHEM ANLYZR: CPT | Performed by: EMERGENCY MEDICINE

## 2019-04-04 RX ORDER — DIAZEPAM 10 MG/2ML
10 INJECTION, SOLUTION INTRAMUSCULAR; INTRAVENOUS ONCE
Status: COMPLETED | OUTPATIENT
Start: 2019-04-04 | End: 2019-04-04

## 2019-04-04 RX ORDER — DIAZEPAM 10 MG/2ML
5 INJECTION, SOLUTION INTRAMUSCULAR; INTRAVENOUS ONCE
Status: COMPLETED | OUTPATIENT
Start: 2019-04-04 | End: 2019-04-04

## 2019-04-04 RX ORDER — DIPHENHYDRAMINE HCL 25 MG
50 TABLET ORAL
Status: ON HOLD | COMMUNITY
End: 2019-04-07

## 2019-04-04 RX ORDER — LORAZEPAM 1 MG/1
2 TABLET ORAL ONCE
Status: COMPLETED | OUTPATIENT
Start: 2019-04-04 | End: 2019-04-04

## 2019-04-04 RX ORDER — LORAZEPAM 2 MG/ML
1 INJECTION INTRAMUSCULAR ONCE
Status: COMPLETED | OUTPATIENT
Start: 2019-04-04 | End: 2019-04-04

## 2019-04-04 RX ORDER — ONDANSETRON 2 MG/ML
8 INJECTION INTRAMUSCULAR; INTRAVENOUS ONCE
Status: COMPLETED | OUTPATIENT
Start: 2019-04-04 | End: 2019-04-04

## 2019-04-04 RX ORDER — LORAZEPAM 1 MG/1
1 TABLET ORAL EVERY 6 HOURS PRN
Qty: 8 TABLET | Refills: 0 | Status: SHIPPED | OUTPATIENT
Start: 2019-04-04 | End: 2019-04-07

## 2019-04-04 RX ORDER — IBUPROFEN 600 MG/1
600 TABLET, FILM COATED ORAL ONCE
Status: COMPLETED | OUTPATIENT
Start: 2019-04-04 | End: 2019-04-04

## 2019-04-04 RX ORDER — HYDROXYZINE HYDROCHLORIDE 25 MG/1
25 TABLET, FILM COATED ORAL 3 TIMES DAILY
Status: ON HOLD | COMMUNITY
End: 2019-04-07

## 2019-04-04 RX ORDER — SODIUM CHLORIDE 9 MG/ML
INJECTION, SOLUTION INTRAVENOUS
Status: COMPLETED
Start: 2019-04-04 | End: 2019-04-04

## 2019-04-04 RX ADMIN — Medication 1000 ML: at 18:09

## 2019-04-04 RX ADMIN — IBUPROFEN 600 MG: 600 TABLET ORAL at 12:03

## 2019-04-04 RX ADMIN — LORAZEPAM 2 MG: 1 TABLET ORAL at 19:06

## 2019-04-04 RX ADMIN — LORAZEPAM 2 MG: 1 TABLET ORAL at 18:09

## 2019-04-04 RX ADMIN — DIAZEPAM 10 MG: 5 INJECTION, SOLUTION INTRAMUSCULAR; INTRAVENOUS at 23:38

## 2019-04-04 RX ADMIN — LORAZEPAM 1 MG: 2 INJECTION INTRAMUSCULAR; INTRAVENOUS at 19:07

## 2019-04-04 RX ADMIN — ONDANSETRON 8 MG: 2 INJECTION INTRAMUSCULAR; INTRAVENOUS at 13:24

## 2019-04-04 RX ADMIN — SODIUM CHLORIDE 1000 ML: 9 INJECTION, SOLUTION INTRAVENOUS at 18:09

## 2019-04-04 RX ADMIN — DIAZEPAM 5 MG: 5 INJECTION, SOLUTION INTRAMUSCULAR; INTRAVENOUS at 22:09

## 2019-04-04 RX ADMIN — FOLIC ACID: 5 INJECTION, SOLUTION INTRAMUSCULAR; INTRAVENOUS; SUBCUTANEOUS at 12:26

## 2019-04-04 ASSESSMENT — ENCOUNTER SYMPTOMS
SHORTNESS OF BREATH: 0
DIAPHORESIS: 1
DIARRHEA: 0
TREMORS: 1
VOMITING: 0
FEVER: 0
ABDOMINAL PAIN: 0
HALLUCINATIONS: 0
DYSURIA: 0
COUGH: 0

## 2019-04-04 NOTE — PHARMACY-ADMISSION MEDICATION HISTORY
"Admission medication history interview status for the 4/4/2019 admission is complete. See Epic admission navigator for allergy information, pharmacy and prior to admission medications.     Medication history interview sources:  Patient, Surescripts prescription filling records    Changes made to PTA medication list (reason)  Added: hydroxyzine, Benadryl  Deleted: Librium, Antabuse, Zofran, Thiamine  Changed: melatonin -> 5 mg at bedtime PRN    Additional medication history information (including reliability of information, actions taken by pharmacist): The patient was a reliable historian. She reported not taking her medications in two weeks. The patient also reported she was worried about her electrolytes specifically her magnesium and potassium. She reported she historically has issues with electrolyte levels \"when she gets this bad.\"      Prior to Admission medications    Medication Sig Last Dose Taking? Auth Provider   diphenhydrAMINE (BENADRYL) 25 MG tablet Take 50 mg by mouth nightly as needed for sleep  Yes Unknown, Entered By History   FLUoxetine (PROZAC) 40 MG capsule Take 1 capsule (40 mg) by mouth daily 2 weeks ago at Unknown time Yes Jeannette Salgado MD   hydrOXYzine (ATARAX) 25 MG tablet Take 25 mg by mouth 3 times daily 2 weeks ago at Unknown time Yes Unknown, Entered By History   melatonin 5 MG tablet Take 5 mg by mouth nightly as needed for sleep  Yes Unknown, Entered By History   multivitamin, therapeutic with minerals (THERA-VIT-M) TABS tablet Take 1 tablet by mouth daily 2 weeks ago at Unknown time Yes Jeannette Salgado MD   prazosin (MINIPRESS) 1 MG capsule Take 1 capsule (1 mg) by mouth At Bedtime 2 weeks ago at Unknown time Yes Jeannette Salgado MD   propranolol (INDERAL) 20 MG tablet Take 1 tablet (20 mg) by mouth 2 times daily 2 weeks ago at Unknown time Yes Norman Navas MD       Medication history completed by:   Ivanna Darling, PharmD, [facility]  [facility] " [facility]  Available daily from 1-9 PM: phone [phone], ascom *[phone], pager [phone]

## 2019-04-04 NOTE — ED PROVIDER NOTES
History     Chief Complaint   Patient presents with     Addiction Problem     HPI  Pako Miller is a 43 year old female who presents emergency room intoxicated requesting detox.  Patient states that she drinks approximately a liter of vodka daily.  She has been doing this for over a month.  She has a history of alcohol abuse and has been in and out of detox and rehab in the past.  She does not use any other illicit drugs.  She has no acute symptoms of suicidal ideations or homicidal ideations.  She states that her last withdrawal symptoms were yesterday where she thinks that she may have had a withdrawal seizure but typically just has shaking.    I have reviewed the Medications, Allergies, Past Medical and Surgical History, and Social History in the Epic system.    Review of Systems   All other systems reviewed and are negative.      Physical Exam   BP: (!) 137/93  Pulse: 134  Temp: 98.2  F (36.8  C)  Resp: 16  Weight: 69.2 kg (152 lb 9.6 oz)  SpO2: 97 %      Physical Exam   Constitutional: She is oriented to person, place, and time. She appears well-developed and well-nourished. No distress.   Presents intoxicated however is able to stand and ambulate without assistance.  She is mildly slurring her speech.  She is otherwise tearful while sitting with a friend at the bedside.   HENT:   Head: Normocephalic and atraumatic.   Eyes: EOM are normal. Pupils are equal, round, and reactive to light. No scleral icterus.   Neck: Normal range of motion. Neck supple.   Cardiovascular: Intact distal pulses.   Pulmonary/Chest: Effort normal. No respiratory distress.   Abdominal: Soft.   Neurological: She is alert and oriented to person, place, and time.   Able to ambulate without issue   Skin: Skin is warm and dry. No rash noted. She is not diaphoretic. No erythema. No pallor.   Psychiatric: She expresses no homicidal and no suicidal ideation. She expresses no suicidal plans and no homicidal plans.   Patient is sad and  tearful       ED Course        Procedures             Critical Care time:  none             Labs Ordered and Resulted from Time of ED Arrival Up to the Time of Departure from the ED   ALCOHOL BREATH TEST POCT - Abnormal; Notable for the following components:       Result Value    Alcohol Breath Test 0.374 (*)     All other components within normal limits     Patient presents with acute alcohol intoxication without evidence of co-ingestion or  trauma per history and exam. Will observe patient in ED with frequent monitoring and  reassessment. Plan to PO trial, reassess mental status, and assess gait when more stable.  No evidence of withdrawal currently.         Assessments & Plan (with Medical Decision Making)     This is a 43-year-old female patient presenting to the emergency room intoxicated requesting detox.  She is intoxicated here in the emergency room but is able to ambulate without issue.  She has no acute withdrawal symptoms at this time.  She does have a friend who is with her.  Unfortunately we do not have any inpatient detox beds at this time.  She did not want to go to any other detox center.  She feels that she can safely be discharged with her friend who is willing to take her and observe her overnight.  She will attempt to call detox in the morning.  I will discharge her with a prescription for Librium as well as Zofran.  She is encouraged to return to the emergency room if she has any concerns.  Patient understands and agrees with discharge instructions and aftercare planning.    I have reviewed the nursing notes.    I have reviewed the findings, diagnosis, plan and need for follow up with the patient.       Medication List      Started    chlordiazePOXIDE 25 MG capsule  Commonly known as:  LIBRIUM  50 mg, Oral, 3 TIMES DAILY PRN     ondansetron 4 MG ODT tab  Commonly known as:  ZOFRAN ODT  4 mg, Oral, EVERY 8 HOURS PRN     propranolol 20 MG tablet  Commonly known as:  INDERAL  20 mg, Oral, 2 TIMES  DAILY            Final diagnoses:   Alcoholic intoxication without complication (H)       4/3/2019   [facility], [place], EMERGENCY DEPARTMENT     Norman Navas MD  04/04/19 0020       Norman Navas MD  04/04/19 0021
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
## 2019-04-04 NOTE — ED NOTES
ED to Behavioral Floor Handoff    SITUATION  Pako Miller is a 43 year old female who speaks English and lives is homeless with a spouse The patient arrived in the ED by private car from home with a complaint of Alcohol Intoxication  .The patient's current symptoms started/worsened 2 week(s) ago and during this time the symptoms have increased.   In the ED, pt was diagnosed with   Final diagnoses:   Alcohol intoxication, uncomplicated (H)        Initial vitals were: BP: 120/83  Pulse: 127  Temp: 97.3  F (36.3  C)  Resp: 18  SpO2: 97 %   --------  Is the patient diabetic? No   If yes, last blood glucose? --     If yes, was this treated in the ED? --  --------  Is the patient inebriated (ETOH) Yes or Impaired on other substances? No  MSSA done? Yes  Last MSSA score: 19    Were withdrawal symptoms treated? N/A  Does the patient have a seizure history? Yes. If yes, date of most recent seizure--  --------  Is the patient patient experiencing suicidal ideation? reports occasional suicidal thoughts representing feeling that life is not worth feeling    Homicidal ideation? denies current or recent homicidal ideation or behaviors.    Self-injurious behavior/urges? denies current or recent self injurious behavior or ideation.  ------  Was pt aggressive in the ED No  Was a code called No  Is the pt now cooperative? Yes  -------  Meds given in ED:   Medications   dextrose 5% and 0.45% NaCl 1,000 mL with INFUVITE ADULT 10 mL, thiamine 100 mg, folic acid 1 mg infusion ( Intravenous Stopped 4/4/19 1330)   ibuprofen (ADVIL/MOTRIN) tablet 600 mg (600 mg Oral Given 4/4/19 1203)   ondansetron (ZOFRAN) injection 8 mg (8 mg Intravenous Given 4/4/19 1324)      Family present during ED course? No  Family currently present? No    BACKGROUND  Does the patient have a cognitive impairment or developmental disability? No  Allergies:   Allergies   Allergen Reactions     Celebrex [Celecoxib]      Cymbalta      Fish Allergy      Shellfish  Allergy      Vicodin [Hydrocodone-Acetaminophen] Nausea and Vomiting     Zithromax [Azithromycin Dihydrate] Nausea   .   Social demographics are   Social History     Socioeconomic History     Marital status:      Spouse name: None     Number of children: None     Years of education: None     Highest education level: None   Occupational History     None   Social Needs     Financial resource strain: None     Food insecurity:     Worry: None     Inability: None     Transportation needs:     Medical: None     Non-medical: None   Tobacco Use     Smoking status: Current Every Day Smoker     Packs/day: 1.00     Years: 20.00     Pack years: 20.00     Types: Cigarettes     Smokeless tobacco: Never Used   Substance and Sexual Activity     Alcohol use: Yes     Comment: 2 pints a day of vodka     Drug use: No     Types: Methamphetamines     Sexual activity: Yes     Partners: Female   Lifestyle     Physical activity:     Days per week: None     Minutes per session: None     Stress: None   Relationships     Social connections:     Talks on phone: None     Gets together: None     Attends Buddhist service: None     Active member of club or organization: None     Attends meetings of clubs or organizations: None     Relationship status: None     Intimate partner violence:     Fear of current or ex partner: None     Emotionally abused: None     Physically abused: None     Forced sexual activity: None   Other Topics Concern     Parent/sibling w/ CABG, MI or angioplasty before 65F 55M? Not Asked   Social History Narrative     None        ASSESSMENT  Labs results   Labs Ordered and Resulted from Time of ED Arrival Up to the Time of Departure from the ED   CBC WITH PLATELETS DIFFERENTIAL - Abnormal; Notable for the following components:       Result Value    RBC Count 5.26 (*)     Hemoglobin 16.3 (*)     Hematocrit 47.9 (*)     Platelet Count 88 (*)     All other components within normal limits   COMPREHENSIVE METABOLIC PANEL -  Abnormal; Notable for the following components:    Glucose 103 (*)     Creatinine 0.48 (*)     AST 50 (*)     All other components within normal limits   ALCOHOL BREATH TEST POCT - Abnormal; Notable for the following components:    Alcohol Breath Test 0.376 (*)     All other components within normal limits   TSH WITH FREE T4 REFLEX   DRUG ABUSE SCREEN 6 CHEM DEP URINE ([facility])   HCG QUALITATIVE URINE   VITAL SIGNS      Imaging Studies: No results found for this or any previous visit (from the past 24 hour(s)).   Most recent vital signs /88   Pulse 104   Temp 97.8  F (36.6  C) (Oral)   Resp 18   LMP 01/15/2019   SpO2 99%    Abnormal labs/tests/findings requiring intervention:---   Pain control: pt had none  Nausea control: good    RECOMMENDATION  Are any infection precautions needed (MRSA, VRE, etc.)? No If yes, what infection? --  ---  Does the patient have mobility issues? independently. If yes, what device does the pt use? ---  ---  Is patient on 72 hour hold or commitment? No If on 72 hour hold, have hold and rights been given to patient? N/A  Are admitting orders written if after 10 p.m. ?N/A  Tasks needing to be completed:---     Davidson hicks--    [phone] [facility]   [phone] ED

## 2019-04-04 NOTE — ED PROVIDER NOTES
"  History     Chief Complaint   Patient presents with     Alcohol Intoxication     The history is provided by the patient and medical records.     Pako Miller is a 43 year old female with a history of alcohol abuse, alcohol withdrawal w/ seizures, chemical dependency, alcoholic pancreatitis, GERD, bipolar disorder, and PTSD, who presents to the Emergency Department seeking detoxification from alcohol.  The patient reports drinking 2 pints of vodka per day, intermittently for years, with her last drink at midnight (04/04/2019). The patient denies other drugs, however, she clarified by saying, \"well I did meth one time\". She reports spending time with her boyfriend, approximately two weeks ago around, \"a lot of crystal meth smokers\".  The patient complains of tremors and diaphoresis, which she states is typical for her during alcohol withdrawal.  The patient denies chest pain, shortness of breath, fever, emesis, diarrhea, suicidal ideation, as well as visual or auditory hallucinations. The patient states that she has not taken her bipolar medications for, \"a couple of weeks\".  She also articulates concern for pregnancy.    Of note, the patient reported a history of heart disease saying, \"I have a heart lesion\". Per documentation the patient is recorded to have resolved paroxymal SVT.     I have reviewed the Medications, Allergies, Past Medical and Surgical History, and Social History in the [facility] system.    Past Medical History:   Diagnosis Date     Anxiety      Depressive disorder      Lipoma of neck      Ruptured disc, cervical      Substance abuse (H)        Past Surgical History:   Procedure Laterality Date     NO HISTORY OF SURGERY         Family History   Problem Relation Age of Onset     Depression Mother      Anxiety Disorder Mother      Mental Illness Mother         Bulima/Anorexia     Depression Father      Anxiety Disorder Father      Bipolar Disorder Father      Substance Abuse Father      Mental " Illness Father      Depression Paternal Grandmother      Anxiety Disorder Paternal Grandmother      Substance Abuse Paternal Grandmother      Substance Abuse Paternal Grandfather      Depression Brother      Anxiety Disorder Brother      Substance Abuse Brother      Depression Son      Depression Brother      Anxiety Disorder Brother      Substance Abuse Brother      Family History Negative No family hx of        Social History     Tobacco Use     Smoking status: Current Every Day Smoker     Packs/day: 1.00     Years: 20.00     Pack years: 20.00     Types: Cigarettes     Smokeless tobacco: Never Used   Substance Use Topics     Alcohol use: Yes     Comment: 2 pints a day of vodka       Current Facility-Administered Medications   Medication     LORazepam (ATIVAN) injection 1 mg     LORazepam (ATIVAN) tablet 2 mg     Current Outpatient Medications   Medication     diphenhydrAMINE (BENADRYL) 25 MG tablet     FLUoxetine (PROZAC) 40 MG capsule     hydrOXYzine (ATARAX) 25 MG tablet     LORazepam (ATIVAN) 1 MG tablet     melatonin 5 MG tablet     multivitamin, therapeutic with minerals (THERA-VIT-M) TABS tablet     prazosin (MINIPRESS) 1 MG capsule     propranolol (INDERAL) 20 MG tablet        Allergies   Allergen Reactions     Fish Allergy Anaphylaxis     Shellfish Allergy Anaphylaxis     Celebrex [Celecoxib]      Cymbalta      Vicodin [Hydrocodone-Acetaminophen] Nausea and Vomiting     Zithromax [Azithromycin Dihydrate] Nausea       Review of Systems   Constitutional: Positive for diaphoresis. Negative for fever.   Respiratory: Negative for cough and shortness of breath.    Cardiovascular: Negative for chest pain.   Gastrointestinal: Negative for abdominal pain, diarrhea and vomiting.   Genitourinary: Negative for dysuria.   Neurological: Positive for tremors.   Psychiatric/Behavioral: Negative for hallucinations (visual or auditory) and suicidal ideas.   All other systems reviewed and are negative.      Physical Exam    BP: 120/83  Pulse: 127  Temp: 97.3  F (36.3  C)  Resp: 18  SpO2: 97 %      Physical Exam  GEN:  Well developed, no acute distress, but is tearful at times  HEENT:  EOMI, Mucous membranes are moist.   Cardio: Regular tachycardia, no murmur, radial pulses equal bilaterally  PULM:  Lungs clear, good air movement, no wheezes, rales,   Abd:  Soft, normal bowel sounds, no focal tenderness  Musculoskeletal:  normal range of motion, no lower extremity swelling or calf tenderness  Neuro:  Alert and oriented X3, Follows commands, moving all extremities spontaneously   Skin:  Warm, dry  Psychiatric: Tearful, no suicidal ideation, homicidal ideation, no auditory or visual hallucinations    ED Course   11:31 AM  The patient was seen and examined by Dr. Ortiz in Room ED09.        Procedures       Labs are normal except as shown.       Critical Care time:  none  Patient was given IV fluids for suspected dehydration given her tachycardia.  She was given IV thiamine and folate (banana bag).  Heart rate decreased down to 104.  She was given ibuprofen for her ear pain.  Patient states that she has a known ruptured tympanic membrane.  Labs are normal except as shown.  Patient had a bed held for detox.  Unfortunately however patient does not have insurance coverage for detox admission.  She was unwilling to pay for the admission herself.  I checked with [facility], they do have beds, however the patient was in [facility] while drinking and therefore is not eligible to go to [facility].    At this time, social work is helping to arrange for shelter placement for the patient tonight however patient is starting to have some withdrawal symptoms and had an increase in her heart rate, and is being treated with p.o. Ativan for this.  I reevaluated the patient at 6:45 PM.  She is very tremulous, tachycardic, sweaty, showing signs of significant alcohol withdrawal.  We will admit the patient to a medical bed for alcohol  withdrawal rather than discharge her to a shelter.  Patient is at risk for seizures and will need medical management of her withdrawal.      Labs Ordered and Resulted from Time of ED Arrival Up to the Time of Departure from the ED   DRUG ABUSE SCREEN 6 CHEM DEP URINE ([facility]) - Abnormal; Notable for the following components:       Result Value    Ethanol Qual Urine Positive (*)     All other components within normal limits   CBC WITH PLATELETS DIFFERENTIAL - Abnormal; Notable for the following components:    RBC Count 5.26 (*)     Hemoglobin 16.3 (*)     Hematocrit 47.9 (*)     Platelet Count 88 (*)     All other components within normal limits   COMPREHENSIVE METABOLIC PANEL - Abnormal; Notable for the following components:    Glucose 103 (*)     Creatinine 0.48 (*)     AST 50 (*)     All other components within normal limits   ALCOHOL BREATH TEST POCT - Abnormal; Notable for the following components:    Alcohol Breath Test 0.376 (*)     All other components within normal limits   HCG QUALITATIVE URINE   TSH WITH FREE T4 REFLEX   VITAL SIGNS   VITAL SIGNS            Assessments & Plan (with Medical Decision Making)   Patient presents with alcohol dependence seeking detox admission, however does not have insurance coverage for the admission and is unwilling to pay out-of-pocket.  After spending many hours in the emergency department and sobering, the patient began to have significant alcohol withdrawal with tremors, tachycardia, shaking, anxiety.  She will be admitted to a medical bed for alcohol withdrawal.    I have reviewed the nursing notes.    I have reviewed the findings, diagnosis, plan and need for follow up with the patient.       Medication List      Started    LORazepam 1 MG tablet  Commonly known as:  ATIVAN  1 mg, Oral, EVERY 6 HOURS PRN            Final diagnoses:   Alcohol intoxication, uncomplicated (H)   Alcohol withdrawal, uncomplicated (H)   INorman, am serving as a trained medical  scribe to document services personally performed by Susy Ortiz MD, based on the provider's statements to me.      I, Susy Ortiz MD, was physically present and have reviewed and verified the accuracy of this note documented by Norman Newton.     4/4/2019   [facility], [place], EMERGENCY DEPARTMENT     Susy Ortiz MD  04/04/19 [id number]

## 2019-04-05 PROBLEM — F10.94 ALCOHOL-INDUCED MOOD DISORDER WITH DEPRESSIVE SYMPTOMS (H): Status: ACTIVE | Noted: 2019-04-05

## 2019-04-05 PROBLEM — R63.0 ANOREXIA: Status: ACTIVE | Noted: 2019-04-05

## 2019-04-05 PROBLEM — F60.3 BORDERLINE PERSONALITY DISORDER (H): Status: ACTIVE | Noted: 2019-04-05

## 2019-04-05 PROBLEM — F10.939 ALCOHOL WITHDRAWAL (H): Status: ACTIVE | Noted: 2018-11-20

## 2019-04-05 LAB
ALBUMIN SERPL-MCNC: 2.9 G/DL (ref 3.4–5)
ALP SERPL-CCNC: 83 U/L (ref 40–150)
ALT SERPL W P-5'-P-CCNC: 17 U/L (ref 0–50)
ANION GAP SERPL CALCULATED.3IONS-SCNC: 9 MMOL/L (ref 3–14)
AST SERPL W P-5'-P-CCNC: 37 U/L (ref 0–45)
BILIRUB SERPL-MCNC: 1.3 MG/DL (ref 0.2–1.3)
BUN SERPL-MCNC: 8 MG/DL (ref 7–30)
CALCIUM SERPL-MCNC: 7.7 MG/DL (ref 8.5–10.1)
CHLORIDE SERPL-SCNC: 103 MMOL/L (ref 94–109)
CO2 SERPL-SCNC: 27 MMOL/L (ref 20–32)
CREAT SERPL-MCNC: 0.52 MG/DL (ref 0.52–1.04)
ERYTHROCYTE [DISTWIDTH] IN BLOOD BY AUTOMATED COUNT: 14.1 % (ref 10–15)
GFR SERPL CREATININE-BSD FRML MDRD: >90 ML/MIN/{1.73_M2}
GLUCOSE SERPL-MCNC: 89 MG/DL (ref 70–99)
HBV SURFACE AB SERPL IA-ACNC: 7.96 M[IU]/ML
HBV SURFACE AB SERPL IA-ACNC: NORMAL M[IU]/ML
HBV SURFACE AG SERPL QL IA: NONREACTIVE
HBV SURFACE AG SERPL QL IA: NORMAL
HCT VFR BLD AUTO: 36.7 % (ref 35–47)
HCV AB SERPL QL IA: NONREACTIVE
HCV AB SERPL QL IA: NORMAL
HGB BLD-MCNC: 12.2 G/DL (ref 11.7–15.7)
HIV 1+2 AB+HIV1 P24 AG SERPL QL IA: NONREACTIVE
HIV 1+2 AB+HIV1 P24 AG SERPL QL IA: NORMAL
LACTATE BLD-SCNC: 0.6 MMOL/L (ref 0.7–2)
MAGNESIUM SERPL-MCNC: 1.4 MG/DL (ref 1.6–2.3)
MAGNESIUM SERPL-MCNC: 2.8 MG/DL (ref 1.6–2.3)
MCH RBC QN AUTO: 30.7 PG (ref 26.5–33)
MCHC RBC AUTO-ENTMCNC: 33.2 G/DL (ref 31.5–36.5)
MCV RBC AUTO: 92 FL (ref 78–100)
PHOSPHATE SERPL-MCNC: 3.3 MG/DL (ref 2.5–4.5)
PLATELET # BLD AUTO: 53 10E9/L (ref 150–450)
POTASSIUM SERPL-SCNC: 3.3 MMOL/L (ref 3.4–5.3)
POTASSIUM SERPL-SCNC: 4.8 MMOL/L (ref 3.4–5.3)
PROT SERPL-MCNC: 5.9 G/DL (ref 6.8–8.8)
RBC # BLD AUTO: 3.98 10E12/L (ref 3.8–5.2)
SODIUM SERPL-SCNC: 139 MMOL/L (ref 133–144)
WBC # BLD AUTO: 3 10E9/L (ref 4–11)

## 2019-04-05 PROCEDURE — 25800025 ZZH RX 258: Performed by: INTERNAL MEDICINE

## 2019-04-05 PROCEDURE — 87491 CHLMYD TRACH DNA AMP PROBE: CPT | Performed by: INTERNAL MEDICINE

## 2019-04-05 PROCEDURE — 99221 1ST HOSP IP/OBS SF/LOW 40: CPT | Mod: AI | Performed by: INTERNAL MEDICINE

## 2019-04-05 PROCEDURE — 80053 COMPREHEN METABOLIC PANEL: CPT | Performed by: INTERNAL MEDICINE

## 2019-04-05 PROCEDURE — 99221 1ST HOSP IP/OBS SF/LOW 40: CPT | Performed by: PSYCHIATRY & NEUROLOGY

## 2019-04-05 PROCEDURE — 84132 ASSAY OF SERUM POTASSIUM: CPT | Performed by: INTERNAL MEDICINE

## 2019-04-05 PROCEDURE — 83735 ASSAY OF MAGNESIUM: CPT | Performed by: INTERNAL MEDICINE

## 2019-04-05 PROCEDURE — 25000132 ZZH RX MED GY IP 250 OP 250 PS 637: Performed by: INTERNAL MEDICINE

## 2019-04-05 PROCEDURE — 83605 ASSAY OF LACTIC ACID: CPT | Performed by: INTERNAL MEDICINE

## 2019-04-05 PROCEDURE — 36415 COLL VENOUS BLD VENIPUNCTURE: CPT | Performed by: INTERNAL MEDICINE

## 2019-04-05 PROCEDURE — 99207 ZZC CDG-HISTORY COMP: MEETS EXP. PROB FOCUSED- DOWN CODED LACK OF PFSH: CPT | Performed by: INTERNAL MEDICINE

## 2019-04-05 PROCEDURE — 12000001 ZZH R&B MED SURG/OB UMMC

## 2019-04-05 PROCEDURE — 87591 N.GONORRHOEAE DNA AMP PROB: CPT | Performed by: INTERNAL MEDICINE

## 2019-04-05 PROCEDURE — 25000128 H RX IP 250 OP 636: Performed by: INTERNAL MEDICINE

## 2019-04-05 PROCEDURE — 85027 COMPLETE CBC AUTOMATED: CPT | Performed by: INTERNAL MEDICINE

## 2019-04-05 PROCEDURE — 84100 ASSAY OF PHOSPHORUS: CPT | Performed by: INTERNAL MEDICINE

## 2019-04-05 RX ORDER — HYDROXYZINE HYDROCHLORIDE 25 MG/1
25 TABLET, FILM COATED ORAL 3 TIMES DAILY PRN
Status: DISCONTINUED | OUTPATIENT
Start: 2019-04-05 | End: 2019-04-07 | Stop reason: HOSPADM

## 2019-04-05 RX ORDER — NALOXONE HYDROCHLORIDE 0.4 MG/ML
.1-.4 INJECTION, SOLUTION INTRAMUSCULAR; INTRAVENOUS; SUBCUTANEOUS
Status: DISCONTINUED | OUTPATIENT
Start: 2019-04-05 | End: 2019-04-07 | Stop reason: HOSPADM

## 2019-04-05 RX ORDER — PRAZOSIN HYDROCHLORIDE 1 MG/1
1 CAPSULE ORAL AT BEDTIME
Status: DISCONTINUED | OUTPATIENT
Start: 2019-04-05 | End: 2019-04-07 | Stop reason: HOSPADM

## 2019-04-05 RX ORDER — POTASSIUM CHLORIDE 1.5 G/1.58G
20-40 POWDER, FOR SOLUTION ORAL
Status: DISCONTINUED | OUTPATIENT
Start: 2019-04-05 | End: 2019-04-07 | Stop reason: HOSPADM

## 2019-04-05 RX ORDER — ONDANSETRON 2 MG/ML
4 INJECTION INTRAMUSCULAR; INTRAVENOUS EVERY 6 HOURS PRN
Status: DISCONTINUED | OUTPATIENT
Start: 2019-04-05 | End: 2019-04-07 | Stop reason: HOSPADM

## 2019-04-05 RX ORDER — DEXTROSE MONOHYDRATE, SODIUM CHLORIDE, AND POTASSIUM CHLORIDE 50; 1.49; 9 G/1000ML; G/1000ML; G/1000ML
INJECTION, SOLUTION INTRAVENOUS CONTINUOUS
Status: DISCONTINUED | OUTPATIENT
Start: 2019-04-05 | End: 2019-04-07 | Stop reason: HOSPADM

## 2019-04-05 RX ORDER — POTASSIUM CHLORIDE 750 MG/1
20-40 TABLET, EXTENDED RELEASE ORAL
Status: DISCONTINUED | OUTPATIENT
Start: 2019-04-05 | End: 2019-04-07 | Stop reason: HOSPADM

## 2019-04-05 RX ORDER — MAGNESIUM SULFATE HEPTAHYDRATE 40 MG/ML
4 INJECTION, SOLUTION INTRAVENOUS EVERY 4 HOURS PRN
Status: DISCONTINUED | OUTPATIENT
Start: 2019-04-05 | End: 2019-04-07 | Stop reason: HOSPADM

## 2019-04-05 RX ORDER — POTASSIUM CHLORIDE 7.45 MG/ML
10 INJECTION INTRAVENOUS
Status: DISCONTINUED | OUTPATIENT
Start: 2019-04-05 | End: 2019-04-07 | Stop reason: HOSPADM

## 2019-04-05 RX ORDER — LANOLIN ALCOHOL/MO/W.PET/CERES
100 CREAM (GRAM) TOPICAL DAILY
Status: DISCONTINUED | OUTPATIENT
Start: 2019-04-05 | End: 2019-04-07 | Stop reason: HOSPADM

## 2019-04-05 RX ORDER — PROPRANOLOL HYDROCHLORIDE 20 MG/1
20 TABLET ORAL 2 TIMES DAILY
Status: DISCONTINUED | OUTPATIENT
Start: 2019-04-05 | End: 2019-04-07 | Stop reason: HOSPADM

## 2019-04-05 RX ORDER — POTASSIUM CL/LIDO/0.9 % NACL 10MEQ/0.1L
10 INTRAVENOUS SOLUTION, PIGGYBACK (ML) INTRAVENOUS
Status: DISCONTINUED | OUTPATIENT
Start: 2019-04-05 | End: 2019-04-07 | Stop reason: HOSPADM

## 2019-04-05 RX ORDER — MULTIPLE VITAMINS W/ MINERALS TAB 9MG-400MCG
1 TAB ORAL DAILY
Status: DISCONTINUED | OUTPATIENT
Start: 2019-04-05 | End: 2019-04-07 | Stop reason: HOSPADM

## 2019-04-05 RX ORDER — CALCIUM CARBONATE 500 MG/1
1000 TABLET, CHEWABLE ORAL 4 TIMES DAILY PRN
Status: DISCONTINUED | OUTPATIENT
Start: 2019-04-05 | End: 2019-04-07 | Stop reason: HOSPADM

## 2019-04-05 RX ORDER — POTASSIUM CHLORIDE 29.8 MG/ML
20 INJECTION INTRAVENOUS
Status: DISCONTINUED | OUTPATIENT
Start: 2019-04-05 | End: 2019-04-07 | Stop reason: HOSPADM

## 2019-04-05 RX ORDER — FOLIC ACID 1 MG/1
1 TABLET ORAL DAILY
Status: DISCONTINUED | OUTPATIENT
Start: 2019-04-05 | End: 2019-04-07 | Stop reason: HOSPADM

## 2019-04-05 RX ORDER — ONDANSETRON 4 MG/1
4 TABLET, ORALLY DISINTEGRATING ORAL EVERY 6 HOURS PRN
Status: DISCONTINUED | OUTPATIENT
Start: 2019-04-05 | End: 2019-04-07 | Stop reason: HOSPADM

## 2019-04-05 RX ORDER — DIAZEPAM 5 MG
5-20 TABLET ORAL EVERY 30 MIN PRN
Status: DISCONTINUED | OUTPATIENT
Start: 2019-04-05 | End: 2019-04-07 | Stop reason: HOSPADM

## 2019-04-05 RX ORDER — ACETAMINOPHEN 325 MG/1
650 TABLET ORAL EVERY 6 HOURS PRN
Status: DISCONTINUED | OUTPATIENT
Start: 2019-04-05 | End: 2019-04-07 | Stop reason: HOSPADM

## 2019-04-05 RX ORDER — POLYETHYLENE GLYCOL 3350 17 G
2 POWDER IN PACKET (EA) ORAL
Status: DISCONTINUED | OUTPATIENT
Start: 2019-04-05 | End: 2019-04-07 | Stop reason: HOSPADM

## 2019-04-05 RX ADMIN — DIAZEPAM 10 MG: 5 TABLET ORAL at 01:21

## 2019-04-05 RX ADMIN — PROPRANOLOL HYDROCHLORIDE 20 MG: 20 TABLET ORAL at 20:57

## 2019-04-05 RX ADMIN — Medication 100 MG: at 08:15

## 2019-04-05 RX ADMIN — DIAZEPAM 5 MG: 5 TABLET ORAL at 20:57

## 2019-04-05 RX ADMIN — MAGNESIUM SULFATE IN WATER 4 G: 40 INJECTION, SOLUTION INTRAVENOUS at 09:55

## 2019-04-05 RX ADMIN — POTASSIUM CHLORIDE, DEXTROSE MONOHYDRATE AND SODIUM CHLORIDE: 150; 5; 900 INJECTION, SOLUTION INTRAVENOUS at 01:21

## 2019-04-05 RX ADMIN — MULTIPLE VITAMINS W/ MINERALS TAB 1 TABLET: TAB at 08:15

## 2019-04-05 RX ADMIN — DIAZEPAM 5 MG: 5 TABLET ORAL at 10:09

## 2019-04-05 RX ADMIN — CALCIUM CARBONATE (ANTACID) CHEW TAB 500 MG 1000 MG: 500 CHEW TAB at 17:48

## 2019-04-05 RX ADMIN — PRAZOSIN HYDROCHLORIDE 1 MG: 1 CAPSULE ORAL at 01:21

## 2019-04-05 RX ADMIN — POTASSIUM CHLORIDE 20 MEQ: 750 TABLET, EXTENDED RELEASE ORAL at 11:58

## 2019-04-05 RX ADMIN — DIAZEPAM 5 MG: 5 TABLET ORAL at 05:31

## 2019-04-05 RX ADMIN — FOLIC ACID 1 MG: 1 TABLET ORAL at 08:15

## 2019-04-05 RX ADMIN — DIAZEPAM 5 MG: 5 TABLET ORAL at 13:47

## 2019-04-05 RX ADMIN — DIAZEPAM 5 MG: 5 TABLET ORAL at 17:48

## 2019-04-05 RX ADMIN — NICOTINE POLACRILEX 2 MG: 2 LOZENGE ORAL at 17:48

## 2019-04-05 RX ADMIN — POTASSIUM CHLORIDE, DEXTROSE MONOHYDRATE AND SODIUM CHLORIDE: 150; 5; 900 INJECTION, SOLUTION INTRAVENOUS at 10:03

## 2019-04-05 RX ADMIN — FLUOXETINE 40 MG: 20 CAPSULE ORAL at 08:15

## 2019-04-05 RX ADMIN — POTASSIUM CHLORIDE 40 MEQ: 750 TABLET, EXTENDED RELEASE ORAL at 09:58

## 2019-04-05 RX ADMIN — DIAZEPAM 10 MG: 5 TABLET ORAL at 12:03

## 2019-04-05 RX ADMIN — PRAZOSIN HYDROCHLORIDE 1 MG: 1 CAPSULE ORAL at 20:56

## 2019-04-05 RX ADMIN — PROPRANOLOL HYDROCHLORIDE 20 MG: 20 TABLET ORAL at 06:09

## 2019-04-05 RX ADMIN — POTASSIUM CHLORIDE, DEXTROSE MONOHYDRATE AND SODIUM CHLORIDE: 150; 5; 900 INJECTION, SOLUTION INTRAVENOUS at 17:48

## 2019-04-05 RX ADMIN — ACETAMINOPHEN 650 MG: 325 TABLET, FILM COATED ORAL at 17:48

## 2019-04-05 RX ADMIN — DIAZEPAM 5 MG: 5 TABLET ORAL at 08:21

## 2019-04-05 RX ADMIN — DIAZEPAM 5 MG: 5 TABLET ORAL at 06:11

## 2019-04-05 ASSESSMENT — ACTIVITIES OF DAILY LIVING (ADL)
ADLS_ACUITY_SCORE: 11
FALL_HISTORY_WITHIN_LAST_SIX_MONTHS: YES
ADLS_ACUITY_SCORE: 10
RETIRED_EATING: 0-->INDEPENDENT
COGNITION: 0 - NO COGNITION ISSUES REPORTED
ADLS_ACUITY_SCORE: 11
ADLS_ACUITY_SCORE: 11
DRESS: 0-->INDEPENDENT
NUMBER_OF_TIMES_PATIENT_HAS_FALLEN_WITHIN_LAST_SIX_MONTHS: 1
TRANSFERRING: 0-->INDEPENDENT
AMBULATION: 0-->INDEPENDENT
SWALLOWING: 0-->SWALLOWS FOODS/LIQUIDS WITHOUT DIFFICULTY
PRIOR_FUNCTIONAL_LEVEL_COMMENT: INDEPENDENT
BATHING: 0-->INDEPENDENT
ADLS_ACUITY_SCORE: 11
WHICH_OF_THE_ABOVE_FUNCTIONAL_RISKS_HAD_A_RECENT_ONSET_OR_CHANGE?: FALL HISTORY
TOILETING: 0-->INDEPENDENT
RETIRED_COMMUNICATION: 0-->UNDERSTANDS/COMMUNICATES WITHOUT DIFFICULTY

## 2019-04-05 ASSESSMENT — PAIN DESCRIPTION - DESCRIPTORS: DESCRIPTORS: ACHING

## 2019-04-05 NOTE — CONSULTS
Initial psychiatric consult completed. Dictation completed and awaiting transcription.    Recs:  1. Continue with current medications.  2. May increase prazosin to target nightmares if needed. Typical response in 6-12 mg range  3. Complete rule 25 and refer for CD treatment. Patient would do best with [id number] placement due to high risk of relapse.

## 2019-04-05 NOTE — PLAN OF CARE
VS:   /81   Pulse 115   Temp 97.6  F (36.4  C) (Oral)   Resp 16   Wt 71.2 kg (156 lb 14.4 oz)   LMP 01/15/2019   SpO2 95%   BMI 27.79 kg/m     Arrived to unit at midnight via cart.Alert,orientated x3.  MSSA scored 12,tremulous,HR up to 120's.States last drink was yesterday morning.Admits being homeless,staying in hotel x8mths.Feels depressed but denies suicidal ideation.   Output:   Due to void.Is passing gas.   Activity:   Unsteady,tremulous.Bed alarm for safety.Call light use is reinforced.   Skin: intact   Pain:   Denies,admits generalized weaknesses.   Neuro/CMS:   intact   Dressing(s):   none   Diet:   Regular,couple bites of robyn crackers.Drinking descent amts  Of fluids..   LDA:   PIV line R arm with IVF running at 100ml/hr.   Equipment:   Placed on tele,sinus tachy with HR on the 110-125.High up to 130 when with activity.Given valium.Propanolol initiated this morning.Bedside commode due to unsteady gait/tremors.Seizure pads on side rails d/t history of seizures in the past.Bed alarm.   Plan:   Treat alc withdrawal.   Additional Info:   Pt came along with her big suit case,a shoulder bag and a belonging bag.Denying bringing along home medication as she mentioned she run out.

## 2019-04-05 NOTE — PLAN OF CARE
Patient A & O x 4. Neuros and CMS intact except patient is having withdrawal tremors. VSS and afebrile, SpO2 97% on room air (see flowsheet), MD aware. LS clear, BS + x 4, patient not passing flatus. Patient urinating good amounts of clear yellow urine. Patient denies pain. Patient is on the MSSA withdrawal protocol and last scored a 10, PO Valium given as ordered. Patient is on seizure precautions and pads are on bed. Right PIV infusing. Patient on a regular diet and tolerating it. BG was 89 at 0621. Patient up ad morena with assist of 1 to the bathroom. Hgb 12.2, K+ 3.3, Mg 1.4 this AM, MD aware. Patient also triggered the Sepsis protocol and Lactic Acid was 0.6. K+ and Mg replacement given as ordered and re-draws ordered per protocol. Patient is on continuous telemetry monitoring and has been in NSR this shift. Patient able to make needs known. Call light within reach. Will continue with POC.

## 2019-04-05 NOTE — H&P
[facility]    History and Physical - Hospitalist Service       Date of Admission:  4/4/2019    Assessment & Plan     Pako Miller is a 43 year old female with a history of alcohol dependency, alcohol withdrawal w/ seizures, alcoholic pancreatitis, GERD, bipolar disorder, and PTSD, homeless, who presents to the ED seeking detoxification from alcohol.      # Alcohol dependency, intoxication.   # Alcohol withdrawal.   S/p iv banana bag in ED.     - MSSA alcohol withdrawal with Diazepam.   - MVI, thiamine, folic acid daily.   - Seizure and fall precautions.  - Sw and CD consult.     # Depression. Anxiety disorder:   - Continue PTA fluoxetine, hydroxyzine, prazosin.     # Migraine hx: Propanolol 20 bid, ppx.        Diet:  Regular diet.   DVT Prophylaxis: Pneumatic Compression Devices  Mccray Catheter: not present  Code Status: full.     Disposition Plan   Expected discharge: 2 - 3 days, recommended to prior living arrangement once medically optimized. .  Entered: Parker Payne MD 04/04/2019, 10:03 PM     Shubham Payne MD  [facility]    ______________________________________________________________________    Chief Complaint     Alcohol intoxication, seeking detox.       History is obtained from the patient, electronic health record and emergency department physician    History of Present Illness     Pako Miller is a 43 year old female with a history of alcohol abuse, alcohol withdrawal w/ seizures, chemical dependency, alcoholic pancreatitis, GERD, bipolar disorder, and PTSD, homeless, who presents to the Emergency Department seeking detoxification from alcohol.      The patient reports drinking 2 pints of vodka per day, intermittently for years, with her last drink at midnight (04/04/2019). The patient denies other drugs to me.  The patient complains of tremors and diaphoresis, nausea which she states is typical for her  "during alcohol withdrawal.  The patient denies chest pain, shortness of breath, fever, emesis, diarrhea, suicidal ideation, as well as visual or auditory hallucinations.   Current Smoker. Smokes 1ppd.     Reports having seizure couple days ago.  Denies any fall or trauma.    Per ED, of note, the patient reported a history of heart disease saying, \"I have a heart lesion\". Per documentation the patient is recorded to have resolved paroxymal SVT.     No other concern.    Review of Systems    The 10 point Review of Systems is negative other than noted in the HPI or here.     Past Medical History    I have reviewed this patient's medical history and updated it with pertinent information if needed.   Past Medical History:   Diagnosis Date     Anxiety      Depressive disorder      Lipoma of neck      Ruptured disc, cervical      Substance abuse (H)        Past Surgical History   I have reviewed this patient's surgical history and updated it with pertinent information if needed.  Past Surgical History:   Procedure Laterality Date     NO HISTORY OF SURGERY         Social History   I have reviewed this patient's social history and updated it with pertinent information if needed.  Social History     Tobacco Use     Smoking status: Current Every Day Smoker     Packs/day: 1.00     Years: 20.00     Pack years: 20.00     Types: Cigarettes     Smokeless tobacco: Never Used   Substance Use Topics     Alcohol use: Yes     Comment: 2 pints a day of vodka     Drug use: No     Types: Methamphetamines       Family History   I have reviewed this patient's family history and updated it with pertinent information if needed.   Family History   Problem Relation Age of Onset     Depression Mother      Anxiety Disorder Mother      Mental Illness Mother         Bulima/Anorexia     Depression Father      Anxiety Disorder Father      Bipolar Disorder Father      Substance Abuse Father      Mental Illness Father      Depression Paternal Grandmother "      Anxiety Disorder Paternal Grandmother      Substance Abuse Paternal Grandmother      Substance Abuse Paternal Grandfather      Depression Brother      Anxiety Disorder Brother      Substance Abuse Brother      Depression Son      Depression Brother      Anxiety Disorder Brother      Substance Abuse Brother      Family History Negative No family hx of        Prior to Admission Medications   Prior to Admission Medications   Prescriptions Last Dose Informant Patient Reported? Taking?   FLUoxetine (PROZAC) 40 MG capsule 2 weeks ago at Unknown time  No Yes   Sig: Take 1 capsule (40 mg) by mouth daily   diphenhydrAMINE (BENADRYL) 25 MG tablet   Yes Yes   Sig: Take 50 mg by mouth nightly as needed for sleep   hydrOXYzine (ATARAX) 25 MG tablet 2 weeks ago at Unknown time  Yes Yes   Sig: Take 25 mg by mouth 3 times daily   melatonin 5 MG tablet   Yes Yes   Sig: Take 5 mg by mouth nightly as needed for sleep   multivitamin, therapeutic with minerals (THERA-VIT-M) TABS tablet 2 weeks ago at Unknown time  No Yes   Sig: Take 1 tablet by mouth daily   prazosin (MINIPRESS) 1 MG capsule 2 weeks ago at Unknown time  No Yes   Sig: Take 1 capsule (1 mg) by mouth At Bedtime   propranolol (INDERAL) 20 MG tablet 2 weeks ago at Unknown time  No Yes   Sig: Take 1 tablet (20 mg) by mouth 2 times daily      Facility-Administered Medications: None     Allergies   Allergies   Allergen Reactions     Fish Allergy Anaphylaxis     Shellfish Allergy Anaphylaxis     Celebrex [Celecoxib]      Cymbalta      Vicodin [Hydrocodone-Acetaminophen] Nausea and Vomiting     Zithromax [Azithromycin Dihydrate] Nausea       Physical Exam   Vital Signs: Temp: 99  F (37.2  C) Temp src: Oral BP: 123/82 Pulse: 134   Resp: 18 SpO2: 91 % O2 Device: None (Room air)    Weight: 0 lbs 0 oz      General: alert, interactive, NAD, anxious, tremulous.   HEENT: AT/NC, PERRLA, anicteric, Moist MM  Neck: Supple. No JVD . No LAD  Respi/Chest: Clear BL, no added  sounds  CVS/Heart: S1S2 regular, no m/r/g, tachycardia.   GI/Abd: Soft, non tender, non distended, BS +  MSK/Extremities: Distal pulses 2+.     Neuro: AO x 4, Grossly intact. Tremors+        Data   Data reviewed today: I reviewed all medications, new labs and imaging results over the last 24 hours. I personally reviewed no images or EKG's today.    Recent Labs   Lab 04/04/19  1157   WBC 4.3   HGB 16.3*   MCV 91   PLT 88*      POTASSIUM 3.4   CHLORIDE 101   CO2 28   BUN 11   CR 0.48*   ANIONGAP 13   YOLY 8.6   *   ALBUMIN 4.0   PROTTOTAL 8.2   BILITOTAL 0.6   ALKPHOS 120   ALT 27   AST 50*     Urine tox: Positive.   Urine HCG: Negative.   TSH: 0.81.    Alcohol Breath Test 0.376 Abnormal      Imaging:     No results found for this or any previous visit (from the past 24 hour(s)).
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
## 2019-04-05 NOTE — PROGRESS NOTES
Clinical Nutrition Services Brief Note - (+) admission nutrition risk screen for weight loss    Patient is a 43 year old female with an admission nutrition risk screen (+) for unintentional weight loss of 10 lbs or more in the past 2 months.    Per documented weight history, patient has not lost any weight and in fact gained weight over the last ~4 months.   Wt Readings from Last 10 Encounters:  04/05/19 : 71.2 kg (156 lb 14.4 oz)  04/03/19 : 69.2 kg (152 lb 9.6 oz)  03/13/19 : 61.2 kg (135 lb) -- OSH  01/06/19 : 56.7 kg (125 lb) -- OSH  01/01/019 : 66.7 kg (147 lb) -- OSH    Due to inappropriate (+) admission nutrition risk screen for weight loss when no weight loss indicated per chart and patient likely to have improved PO when here and not using substances, RD to sign off at this time. RD available by consult if further nutrition intervention warranted prior to discharge.      Jojo De Guzman RD, LD  Unit Pager: [phone]
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
## 2019-04-06 LAB
ALBUMIN SERPL-MCNC: 3 G/DL (ref 3.4–5)
ALP SERPL-CCNC: 86 U/L (ref 40–150)
ALT SERPL W P-5'-P-CCNC: 17 U/L (ref 0–50)
ANION GAP SERPL CALCULATED.3IONS-SCNC: 9 MMOL/L (ref 3–14)
AST SERPL W P-5'-P-CCNC: 35 U/L (ref 0–45)
BILIRUB SERPL-MCNC: 0.9 MG/DL (ref 0.2–1.3)
BUN SERPL-MCNC: 5 MG/DL (ref 7–30)
CALCIUM SERPL-MCNC: 8.3 MG/DL (ref 8.5–10.1)
CHLORIDE SERPL-SCNC: 106 MMOL/L (ref 94–109)
CO2 SERPL-SCNC: 23 MMOL/L (ref 20–32)
CREAT SERPL-MCNC: 0.39 MG/DL (ref 0.52–1.04)
ERYTHROCYTE [DISTWIDTH] IN BLOOD BY AUTOMATED COUNT: 13.9 % (ref 10–15)
GFR SERPL CREATININE-BSD FRML MDRD: >90 ML/MIN/{1.73_M2}
GLUCOSE SERPL-MCNC: 95 MG/DL (ref 70–99)
HCT VFR BLD AUTO: 38.7 % (ref 35–47)
HGB BLD-MCNC: 12.8 G/DL (ref 11.7–15.7)
MAGNESIUM SERPL-MCNC: 1.9 MG/DL (ref 1.6–2.3)
MCH RBC QN AUTO: 30.7 PG (ref 26.5–33)
MCHC RBC AUTO-ENTMCNC: 33.1 G/DL (ref 31.5–36.5)
MCV RBC AUTO: 93 FL (ref 78–100)
PLATELET # BLD AUTO: 57 10E9/L (ref 150–450)
POTASSIUM SERPL-SCNC: 4.2 MMOL/L (ref 3.4–5.3)
PROT SERPL-MCNC: 6.4 G/DL (ref 6.8–8.8)
RBC # BLD AUTO: 4.17 10E12/L (ref 3.8–5.2)
SODIUM SERPL-SCNC: 138 MMOL/L (ref 133–144)
WBC # BLD AUTO: 2.8 10E9/L (ref 4–11)

## 2019-04-06 PROCEDURE — 12000001 ZZH R&B MED SURG/OB UMMC

## 2019-04-06 PROCEDURE — 85027 COMPLETE CBC AUTOMATED: CPT | Performed by: INTERNAL MEDICINE

## 2019-04-06 PROCEDURE — 25000132 ZZH RX MED GY IP 250 OP 250 PS 637: Performed by: INTERNAL MEDICINE

## 2019-04-06 PROCEDURE — 25800025 ZZH RX 258: Performed by: INTERNAL MEDICINE

## 2019-04-06 PROCEDURE — 83735 ASSAY OF MAGNESIUM: CPT | Performed by: INTERNAL MEDICINE

## 2019-04-06 PROCEDURE — 36415 COLL VENOUS BLD VENIPUNCTURE: CPT | Performed by: INTERNAL MEDICINE

## 2019-04-06 PROCEDURE — 80053 COMPREHEN METABOLIC PANEL: CPT | Performed by: INTERNAL MEDICINE

## 2019-04-06 PROCEDURE — 99232 SBSQ HOSP IP/OBS MODERATE 35: CPT | Performed by: INTERNAL MEDICINE

## 2019-04-06 RX ADMIN — MULTIPLE VITAMINS W/ MINERALS TAB 1 TABLET: TAB at 08:10

## 2019-04-06 RX ADMIN — FLUOXETINE 40 MG: 20 CAPSULE ORAL at 08:10

## 2019-04-06 RX ADMIN — DIAZEPAM 5 MG: 5 TABLET ORAL at 11:15

## 2019-04-06 RX ADMIN — PRAZOSIN HYDROCHLORIDE 1 MG: 1 CAPSULE ORAL at 22:23

## 2019-04-06 RX ADMIN — POTASSIUM CHLORIDE, DEXTROSE MONOHYDRATE AND SODIUM CHLORIDE: 150; 5; 900 INJECTION, SOLUTION INTRAVENOUS at 03:43

## 2019-04-06 RX ADMIN — DIAZEPAM 5 MG: 5 TABLET ORAL at 01:00

## 2019-04-06 RX ADMIN — DIAZEPAM 5 MG: 5 TABLET ORAL at 14:16

## 2019-04-06 RX ADMIN — PROPRANOLOL HYDROCHLORIDE 20 MG: 20 TABLET ORAL at 22:23

## 2019-04-06 RX ADMIN — PROPRANOLOL HYDROCHLORIDE 20 MG: 20 TABLET ORAL at 08:10

## 2019-04-06 RX ADMIN — Medication 100 MG: at 08:10

## 2019-04-06 RX ADMIN — POTASSIUM CHLORIDE, DEXTROSE MONOHYDRATE AND SODIUM CHLORIDE: 150; 5; 900 INJECTION, SOLUTION INTRAVENOUS at 13:20

## 2019-04-06 RX ADMIN — DIAZEPAM 5 MG: 5 TABLET ORAL at 08:22

## 2019-04-06 RX ADMIN — FOLIC ACID 1 MG: 1 TABLET ORAL at 08:10

## 2019-04-06 ASSESSMENT — ACTIVITIES OF DAILY LIVING (ADL)
ADLS_ACUITY_SCORE: 10

## 2019-04-06 NOTE — PROGRESS NOTES
[facility] Internal Medicine Progress Note            Interval History:   Record reviewed.  Seen with RN.   Remains on MSSA WD protocol with valium.  Last received 5 mg 1 AM.  Interval decrease shakes, sweats.  Up to BR with stable gait.   No CP, SOB, cough, nausea, reflux, abd pain.  Limited po intake.   Last BM 4/5.  No voiding c/o's.  Psyche consult reviewed with recommended transfer from here to CD program as high relapse risk.            Medications:   All medications reviewed today          Physical Exam:   Blood pressure (!) 142/101, pulse 94, temperature 97.6  F (36.4  C), temperature source Oral, resp. rate 18, weight 71.2 kg (156 lb 14.4 oz), last menstrual period 01/15/2019, SpO2 99 %.    Intake/Output Summary (Last 24 hours) at 4/6/2019 0852  Last data filed at 4/6/2019 0343  Gross per 24 hour   Intake 1490 ml   Output --   Net 1490 ml       General:  Alert.  Appropriate.  No distress.  No O2.     Heent:      Neck:    Skin:    Chest:  clear    Cardiac:  Reg without gallop, murmur.  No JVD.     Abdomen:  Non distended, soft, non-tender.  BS normal.     Extremities:  Perfused.  No edema, calf, posterior thigh tenderness to suggest DVT.     Neuro: Minimal tremor.             Data:     Results for orders placed or performed during the hospital encounter of 04/04/19 (from the past 24 hour(s))   Psychiatry IP Consult: alcohol relapse, bipolar illness, depressed, eating disorder; Consultant may enter orders: Yes; Patient to be seen: Routine; Call back #: [phone]; Requesting provider? Attending physician    Gunner Fuentes MD     4/5/2019  2:47 PM  Initial psychiatric consult completed. Dictation completed and   awaiting transcription.    Recs:  1. Continue with current medications.  2. May increase prazosin to target nightmares if needed. Typical   response in 6-12 mg range  3. Complete rule 25 and refer for CD treatment. Patient would do   best with [id number] placement due to  high risk of relapse.   Magnesium   Result Value Ref Range    Magnesium 2.8 (H) 1.6 - 2.3 mg/dL   Potassium   Result Value Ref Range    Potassium 4.8 3.4 - 5.3 mmol/L   Comprehensive metabolic panel   Result Value Ref Range    Sodium 138 133 - 144 mmol/L    Potassium 4.2 3.4 - 5.3 mmol/L    Chloride 106 94 - 109 mmol/L    Carbon Dioxide 23 20 - 32 mmol/L    Anion Gap 9 3 - 14 mmol/L    Glucose 95 70 - 99 mg/dL    Urea Nitrogen 5 (L) 7 - 30 mg/dL    Creatinine 0.39 (L) 0.52 - 1.04 mg/dL    GFR Estimate >90 >60 mL/min/[1.73_m2]    GFR Estimate If Black >90 >60 mL/min/[1.73_m2]    Calcium 8.3 (L) 8.5 - 10.1 mg/dL    Bilirubin Total 0.9 0.2 - 1.3 mg/dL    Albumin 3.0 (L) 3.4 - 5.0 g/dL    Protein Total 6.4 (L) 6.8 - 8.8 g/dL    Alkaline Phosphatase 86 40 - 150 U/L    ALT 17 0 - 50 U/L    AST 35 0 - 45 U/L   CBC with platelets   Result Value Ref Range    WBC 2.8 (L) 4.0 - 11.0 10e9/L    RBC Count 4.17 3.8 - 5.2 10e12/L    Hemoglobin 12.8 11.7 - 15.7 g/dL    Hematocrit 38.7 35.0 - 47.0 %    MCV 93 78 - 100 fl    MCH 30.7 26.5 - 33.0 pg    MCHC 33.1 31.5 - 36.5 g/dL    RDW 13.9 10.0 - 15.0 %    Platelet Count 57 (L) 150 - 450 10e9/L   Magnesium   Result Value Ref Range    Magnesium 1.9 1.6 - 2.3 mg/dL                  Assessment and Plan:   1.  Alcohol dependence, continuous.  Inability to maintain sobriety despite treatment interventions as above.  Not appropriate for commitment per psychiatry.   2.  Alcohol withdrawal, mild to moderate.  Detoxing.   3.  Withdrawal-related seizure by history.  No evidence for a closed head injury presently.  On valium and precautions.   4.  Mild transaminase elevation consistent with alcoholic hepatitis superimposed on possible fatty change.  Interval normalization.   5.  Pancytopenia, likely related to marrow suppression from alcohol.  Similar issue in the past. Relatively stable counts.  6.  Bipolar illness with depression, anxiety, incompletely compensated.  Consider increase prozac  per psychiatry.   7.  Eating disorder, incompletely compensated.   8.  Cervical disk disease without painful compromise at present.   9.  Nicotine habit.   10.  Concern regarding potential STD risk.  Workup pending.   11.  Electrolyte disturbance with low potassium and magnesium.  Replaced.   12.  Nausea, potentially related to alcohol-associated gastritis or reflux esophagitis.  Improved.  Limited appetite.          PLAN:  1)  Adjust IV.  2)  Continue MSSA with valium, thiamine, folate, MVI.  3)  CD consult.   assistance with treatment.  4)  Continue PPI.  5)  Trend labs.  F/U STD work up.  Monitor clinically.   Disposition Plan   Expected discharge in 2-3 days to prior living arrangement [facility] CD program once detoxed.      Entered: Jos Hays 04/06/2019, 8:52 AM              Attestation:  I have reviewed today's vital signs, notes, medications, labs and imaging.     Jos Hays MD

## 2019-04-06 NOTE — PLAN OF CARE
Patient is A&Ox4, able to make needs known, using call light appropriately.  VSS, tele NSR,  hypertension noted, given scheduled meds this morning, LS clear. CMS intact, Neuros are intact. No c/o pain. BS present, Patient is passing gas, LBM 04/06, Voiding spontaneously in the toilet. Tolerating regular diet, ate breakfast in small increments this morning, d/t GI discomfort. Denies dizziness, SOB & CP. IV running dextrose, K+ chloride at 60ml/hr. Patient is up independently to the bathroom, steady on her feet. MSSA 8,10 & 9 today given valium 5mg X3, patient reports she does not want to take anymore valium as she would like to discharge tomorrow to take care of things before going to treatment. K+ 4.2 and mag 1.9 today. Continue POC.

## 2019-04-06 NOTE — PLAN OF CARE
Alert and oriented X 4. Able to make needs known. Call light in reach.  VSS x  HR tachy. Offers no C/O pain.  MSSA score of 8, received Valium 5 mg PO per protocol. Up with SBA. Voiding without difficulty. PIV patent, IV fluids infusing as ordered. Offers no C/O nausea, headache, dizziness, chest pain or SOB. Appears to be sleeping during rounds. Continue with plan of care.

## 2019-04-06 NOTE — PLAN OF CARE
VSS and afebrile except mild tachycardia.   MSSA score 9 and 8.    Valium 5mg given x2 per protocol.  C/o mild intermittent left chest discomfort--- TUMS given with relief.  C/o headache---- Tylenol and nicotine lozenge given and somewhat relief.  Up to bathroom with SBA .  Voiding urine well. Urine sample sent to lab.  Reports she had small BM today.  IVF fluid infusing at 100ml.  Good appetite with regular diet but not drinking fluid enough.  No postural dizziness or LH.  Steady gait on feet.   Pt is  willing to get CD treatment.

## 2019-04-07 ENCOUNTER — PATIENT OUTREACH (OUTPATIENT)
Dept: CARE COORDINATION | Facility: CLINIC | Age: 44
End: 2019-04-07

## 2019-04-07 VITALS
DIASTOLIC BLOOD PRESSURE: 86 MMHG | HEART RATE: 90 BPM | RESPIRATION RATE: 12 BRPM | WEIGHT: 156.9 LBS | BODY MASS INDEX: 27.79 KG/M2 | OXYGEN SATURATION: 100 % | TEMPERATURE: 97.4 F | SYSTOLIC BLOOD PRESSURE: 121 MMHG

## 2019-04-07 LAB
ALBUMIN SERPL-MCNC: 3.6 G/DL (ref 3.4–5)
ALP SERPL-CCNC: 100 U/L (ref 40–150)
ALT SERPL W P-5'-P-CCNC: 30 U/L (ref 0–50)
ANION GAP SERPL CALCULATED.3IONS-SCNC: 10 MMOL/L (ref 3–14)
AST SERPL W P-5'-P-CCNC: 56 U/L (ref 0–45)
BILIRUB SERPL-MCNC: 0.8 MG/DL (ref 0.2–1.3)
BUN SERPL-MCNC: 9 MG/DL (ref 7–30)
C TRACH DNA SPEC QL NAA+PROBE: NEGATIVE
CALCIUM SERPL-MCNC: 9.2 MG/DL (ref 8.5–10.1)
CHLORIDE SERPL-SCNC: 100 MMOL/L (ref 94–109)
CO2 SERPL-SCNC: 26 MMOL/L (ref 20–32)
CREAT SERPL-MCNC: 0.51 MG/DL (ref 0.52–1.04)
ERYTHROCYTE [DISTWIDTH] IN BLOOD BY AUTOMATED COUNT: 13.9 % (ref 10–15)
GFR SERPL CREATININE-BSD FRML MDRD: >90 ML/MIN/{1.73_M2}
GLUCOSE SERPL-MCNC: 91 MG/DL (ref 70–99)
HCT VFR BLD AUTO: 43.3 % (ref 35–47)
HGB BLD-MCNC: 14.8 G/DL (ref 11.7–15.7)
MAGNESIUM SERPL-MCNC: 2 MG/DL (ref 1.6–2.3)
MCH RBC QN AUTO: 31.5 PG (ref 26.5–33)
MCHC RBC AUTO-ENTMCNC: 34.2 G/DL (ref 31.5–36.5)
MCV RBC AUTO: 92 FL (ref 78–100)
N GONORRHOEA DNA SPEC QL NAA+PROBE: NEGATIVE
PLATELET # BLD AUTO: 61 10E9/L (ref 150–450)
POTASSIUM SERPL-SCNC: 4.2 MMOL/L (ref 3.4–5.3)
PROT SERPL-MCNC: 7.7 G/DL (ref 6.8–8.8)
RBC # BLD AUTO: 4.7 10E12/L (ref 3.8–5.2)
SODIUM SERPL-SCNC: 136 MMOL/L (ref 133–144)
SPECIMEN SOURCE: NORMAL
SPECIMEN SOURCE: NORMAL
WBC # BLD AUTO: 4.1 10E9/L (ref 4–11)

## 2019-04-07 PROCEDURE — 80053 COMPREHEN METABOLIC PANEL: CPT | Performed by: INTERNAL MEDICINE

## 2019-04-07 PROCEDURE — 99239 HOSP IP/OBS DSCHRG MGMT >30: CPT | Performed by: INTERNAL MEDICINE

## 2019-04-07 PROCEDURE — 25000132 ZZH RX MED GY IP 250 OP 250 PS 637: Performed by: INTERNAL MEDICINE

## 2019-04-07 PROCEDURE — 36415 COLL VENOUS BLD VENIPUNCTURE: CPT | Performed by: INTERNAL MEDICINE

## 2019-04-07 PROCEDURE — 83735 ASSAY OF MAGNESIUM: CPT | Performed by: INTERNAL MEDICINE

## 2019-04-07 PROCEDURE — 85027 COMPLETE CBC AUTOMATED: CPT | Performed by: INTERNAL MEDICINE

## 2019-04-07 RX ORDER — FLUOXETINE 40 MG/1
40 CAPSULE ORAL DAILY
Qty: 30 CAPSULE | Refills: 0 | Status: SHIPPED | OUTPATIENT
Start: 2019-04-07 | End: 2019-11-18

## 2019-04-07 RX ORDER — PROPRANOLOL HYDROCHLORIDE 20 MG/1
20 TABLET ORAL 2 TIMES DAILY
Qty: 60 TABLET | Refills: 0 | Status: SHIPPED | OUTPATIENT
Start: 2019-04-07 | End: 2019-11-18

## 2019-04-07 RX ORDER — FOLIC ACID 1 MG/1
1 TABLET ORAL DAILY
Qty: 30 TABLET | Refills: 0 | Status: SHIPPED | OUTPATIENT
Start: 2019-04-08

## 2019-04-07 RX ORDER — MULTIPLE VITAMINS W/ MINERALS TAB 9MG-400MCG
1 TAB ORAL DAILY
Qty: 30 EACH | Refills: 0 | Status: SHIPPED | OUTPATIENT
Start: 2019-04-07

## 2019-04-07 RX ORDER — HYDROXYZINE HYDROCHLORIDE 25 MG/1
25 TABLET, FILM COATED ORAL 3 TIMES DAILY
Qty: 30 TABLET | Refills: 0 | Status: SHIPPED | OUTPATIENT
Start: 2019-04-07 | End: 2019-11-18

## 2019-04-07 RX ORDER — PRAZOSIN HYDROCHLORIDE 1 MG/1
1 CAPSULE ORAL AT BEDTIME
Qty: 30 CAPSULE | Refills: 0 | Status: SHIPPED | OUTPATIENT
Start: 2019-04-07 | End: 2019-11-18

## 2019-04-07 RX ADMIN — Medication 100 MG: at 07:56

## 2019-04-07 RX ADMIN — NICOTINE POLACRILEX 2 MG: 2 LOZENGE ORAL at 11:21

## 2019-04-07 RX ADMIN — FOLIC ACID 1 MG: 1 TABLET ORAL at 07:56

## 2019-04-07 RX ADMIN — PROPRANOLOL HYDROCHLORIDE 20 MG: 20 TABLET ORAL at 07:56

## 2019-04-07 RX ADMIN — MULTIPLE VITAMINS W/ MINERALS TAB 1 TABLET: TAB at 07:56

## 2019-04-07 RX ADMIN — FLUOXETINE 40 MG: 20 CAPSULE ORAL at 07:56

## 2019-04-07 ASSESSMENT — ACTIVITIES OF DAILY LIVING (ADL)
ADLS_ACUITY_SCORE: 10

## 2019-04-07 NOTE — PROGRESS NOTES
[facility] Internal Medicine Progress Note            Interval History:   Record reviewed.  Seen with RN.   Remains on MSSA WD protocol with valium.  Last received 5 mg approx 1400 4/6.   Interval decrease shakes, sweats.  Ambulatory with stable gait.   No postural dizziness.   No CP, SOB, cough, nausea, reflux, abd pain.  Improved po intake.   BM 4/6.  No voiding c/o's.  Psyche consult reviewed with recommended transfer from here to  program as high relapse risk.  Patient indicating anxious for discharge.           Medications:   All medications reviewed today          Physical Exam:   Blood pressure 121/86, pulse 90, temperature 97.4  F (36.3  C), temperature source Oral, resp. rate 12, weight 71.2 kg (156 lb 14.4 oz), last menstrual period 01/15/2019, SpO2 100 %.    Intake/Output Summary (Last 24 hours) at 4/6/2019 0852  Last data filed at 4/6/2019 0343  Gross per 24 hour   Intake 1490 ml   Output --   Net 1490 ml       General:  Alert.  Appropriate.  No distress.  No O2.     Heent:      Neck:    Skin:    Chest:  clear    Cardiac:  Reg without gallop, murmur.  No JVD.     Abdomen:  Non distended, soft, non-tender.  BS normal.     Extremities:  Perfused.  No edema, calf, posterior thigh tenderness to suggest DVT.     Neuro: No tremor             Data:     Results for orders placed or performed during the hospital encounter of 04/04/19 (from the past 24 hour(s))   CBC with platelets   Result Value Ref Range    WBC 4.1 4.0 - 11.0 10e9/L    RBC Count 4.70 3.8 - 5.2 10e12/L    Hemoglobin 14.8 11.7 - 15.7 g/dL    Hematocrit 43.3 35.0 - 47.0 %    MCV 92 78 - 100 fl    MCH 31.5 26.5 - 33.0 pg    MCHC 34.2 31.5 - 36.5 g/dL    RDW 13.9 10.0 - 15.0 %    Platelet Count 61 (L) 150 - 450 10e9/L   Magnesium   Result Value Ref Range    Magnesium 2.0 1.6 - 2.3 mg/dL   Comprehensive metabolic panel   Result Value Ref Range    Sodium 136 133 - 144 mmol/L    Potassium 4.2 3.4 - 5.3 mmol/L    Chloride 100 94 - 109 mmol/L     Carbon Dioxide 26 20 - 32 mmol/L    Anion Gap 10 3 - 14 mmol/L    Glucose 91 70 - 99 mg/dL    Urea Nitrogen 9 7 - 30 mg/dL    Creatinine 0.51 (L) 0.52 - 1.04 mg/dL    GFR Estimate >90 >60 mL/min/[1.73_m2]    GFR Estimate If Black >90 >60 mL/min/[1.73_m2]    Calcium 9.2 8.5 - 10.1 mg/dL    Bilirubin Total 0.8 0.2 - 1.3 mg/dL    Albumin 3.6 3.4 - 5.0 g/dL    Protein Total 7.7 6.8 - 8.8 g/dL    Alkaline Phosphatase 100 40 - 150 U/L    ALT 30 0 - 50 U/L    AST 56 (H) 0 - 45 U/L                  Assessment and Plan:   1.  Alcohol dependence, continuous.  Inability to maintain sobriety despite multiple treatment interventions.   Not appropriate for commitment per psychiatry.  Goal to go directly to treatment or at least have dispo in place.  Desires to return to [facility].   2.  Alcohol withdrawal, mild to moderate.  Appears will be detoxed as of this afternoon.    3.  Withdrawal-related seizure by history.  No evidence for a closed head injury presently.  On valium and precautions.   4.  Minimal transaminase elevation consistent with alcoholic hepatitis superimposed on possible fatty change.    5.  Pancytopenia, likely related to marrow suppression from alcohol.  Similar issue in the past. Relatively stable counts.  6.  Bipolar illness with depression, anxiety, incompletely compensated.  Consider increase prozac per psychiatry.   7.  Eating disorder, incompletely compensated.   8.  Cervical disk disease without painful compromise at present.   9.  Nicotine habit.   10.  Concern regarding potential STD risk.  Chlamydia and GC pending.  Neg HCV and HIV.   11.  Electrolyte disturbance with low potassium and magnesium.  Replaced.   12.  Nausea, potentially related to alcohol-associated gastritis or reflux esophagitis.  Resolved.  Improved appetite.          PLAN:  1)  Continue diet as tolerated.    2)  Complete MSSA with valium, thiamine, folate, MVI.  3)  CD consult.  SW assistance with treatment Monday (attempt to  convince patient to stay as high relapse risk if plan not in place)  4)  Continue PPI.  5) F/U chlamydia and GC.    Monitor clinically.   Disposition Plan   Expected discharge in 1 days to prior living arrangement vs CD program.     Entered: Jos Hays 04/07/2019, 9:15 AM              Attestation:  I have reviewed today's vital signs, notes, medications, labs and imaging.     Jos Hays MD

## 2019-04-07 NOTE — PLAN OF CARE
Pt said she is feeling better, less tremor, pt said she can text now. Has been resting in her room all shift. Had a visitor. Pt wants to be able to go home & take care of some business, get her car etc. Then she said she would go to [place].

## 2019-04-07 NOTE — DISCHARGE SUMMARY
Admit Date:     04/04/2019   Discharge Date:     04/07/2019      FINAL DIAGNOSES:   1.  Alcohol dependence, continuous.  Inability to maintain sobriety despite multiple treatment interventions.  Not appropriate for commitment per Psychiatry.  Goal to discharge patient directly to treatment program.  However, patient desired discharge prior to arrangements.  A safe environment arranged with a close friend (recovering alcoholic with protracted sobriety).  Plan to pursue Rule 25 and inpatient treatment, possibly at [facility] or [facility]..   2.  Alcohol withdrawal.  Mild to moderate.  Detoxification completed with Valium.   3.  History of withdrawal related seizure.  No recurrence.   4.  Minimal transaminase elevation consistent with alcoholic hepatitis superimposed on possible fatty change.   5.  Pancytopenia, likely related to marrow suppression from alcohol.  Similar issue in the past.  Relatively stable counts with platelets up to 61,000.   6.  Bipolar illness with depression, anxiety, incompletely compensated.  On resumed Prozac.   7.  Eating disorder, incompletely compensated by history.   8.  Cervical disk disease without painful compromise.   9.  Nicotine habit.  Ongoing.   10.  Concern regarding potential STD risk.  HIV, HCV, chlamydia and GC negative.  Negative urine pregnancy test.   11.  Electrolyte disturbance with hypokalemia and hypomagnesemia.  Replaced.   12.  Nausea, potentially related to alcohol-associated gastritis or reflux esophagitis.  Resolved.  Good appetite at discharge.      HISTORY OF PRESENT ILLNESS:  43-year-old female admitted to the medical service through the emergency department where she presented requesting alcohol detoxification subsequent to apparent withdrawal related seizure prior to presentation.  History of alcohol dependency, withdrawal, withdrawal related seizures, alcohol-related pancreatitis, gastroesophageal reflux disease, bipolar illness, anxiety, depression, PTSD  and eating disorder.  Alcohol dependency dating back in excess of 15 years.  Detox on multiple occasions.  Last admitted by Dr. Salgado on 03/11/2018.  Subsequent to that time, the patient underwent inpatient CD treatment at the [facility] for 28 days.  Relapsed within the week following discharge.  Interval intake of 1-2 pints of vodka daily.  Concern regarding potential withdrawal related seizure 2 days prior to presentation with loss of consciousness.  No clear head injury.  No tongue trauma.  No incontinence.      ER evaluation demonstrated blood pressure 120/83, heart rate 120s.  Respirations nonlabored, O2 sat 97%.   HEENT:  Head is atraumatic.      LABORATORY DATA:  Complete metabolic profile:  Normal electrolytes, BUN 11, creatinine 0.48.  Normal liver profile except for AST of 50.  TSH 0.81, glucose 103 and 89, respectively.  White count 4300, hemoglobin 16.3, platelet count 88,000.  Alcohol level 04/03 and 0.374 with recheck 0.376.  Urine tox positive for alcohol only.  Intravenous fluid support including normal saline bolus and banana bag.      HOSPITAL COURSE:  Admission to the medical unit where patient with issue of withdrawal seizures, was maintained on MSSA withdrawal protocol using Valium.  Thiamine, multivitamin and folate.  Mild-to-moderate withdrawal.  Symptomatic improvement as of 04/06 with resolution of nausea.  Improved p.o. intake.  Continued MSSA withdrawal protocol on Valium with last Valium requirement at approximately 1400 on 04/06.  By 04/07 patient was detoxed and,anxious for discharge to the outpatient setting.  She was seen early in her course on 04/05 by Dr. Gunner Frankel from Psychiatry.  Impression of alcohol use disorder, severe.  Major depressive disorder, recurrent episode, moderate, exacerbated by alcohol.  Possible PTSD.  Likely borderline personality disorder.  Recommendation on resumption of Prozac.  Prazosin 1 mg at bedtime.  Continued hydroxyzine for anxiety.  Recommendation  on Rule 25 with chemical dependency program.   Felt it would be best for patient transfer directly to a CD program due to high risk of relapse in the outside setting.      Concerns regarding discharge without Rule 25 or definite CD plan in place.  Reviewed at length with the patient.  Recommendation that she remain in the hospital until 04/08 so that CD counselor could perform a Rule 25 and  could help facilitate inpatient CD treatment either at [place] or [facility] where patient preferred.  Despite recommendation, patient desired discharge.  Appeared competent.  Not felt to be appropriate for commitment per Psychiatry.  The patient did make arrangements to stay with a friend, a recovering alcoholic with protracted sobriety.  She was contacted, indicating that she would monitor the patient.  Did advise that she bring the patient back to the hospital if she did relapse.      Otherwise, the patient appeared clinically stable for discharge.  Tolerating a diet.  Ambulatory without gait compromise.  Blood pressure in the 120s/80s, heart rate 90s and regular, O2 sat 100% room air.  Temperature normal.  Minimal tremor on exam.  Benign abdominal exam.  Followup labs included complete metabolic profile with normal electrolytes, BUN 9, creatinine 0.51.  Normal liver function except for minimal AST elevation of 56.  White count 4100, hemoglobin 14.8 grams percent, platelet count 61,000, up from 53,000 on 04/05/2019.      DISCHARGE MEDICATIONS:   1.  Prozac 40 mg daily.   2.  Vistaril 25 mg t.i.d.    3.  Melatonin 5 mg at bedtime p.r.n. sleep.   4.  Multivitamin daily.   5.  Prazosin 1 mg at bedtime.   6.  Propranolol 20 mg b.i.d. (used for migraine headache prophylaxis).   7.  Folic acid 1 mg daily.      RECOMMENDATION:  The patient pursue inpatient CD treatment and Rule 25, as discussed.  A safe environment until accomplished.  To return to the hospital or call her primary care provider p.r.n. relapse.   Recommendation that she did see her primary care provider within the week with followup laboratory studies at that time.      CONDITION AT DISCHARGE:  Stable.         SHYANN HYDE MD             D: 2019   T: 2019   MT: LIZ      Name:     GIRISH ZELAYA   MRN:      [id number]        Account:        [id number]   :      1975           Admit Date:     2019                                  Discharge Date: 2019      Document: [id number]       cc: Allen Chavez MD

## 2019-04-07 NOTE — PLAN OF CARE
Pt. discharged at 1415 to friends house, and left with personal belongings. Pt. received complete discharge paperwork and medications as filled by discharge pharmacy. Pt. was given times of last dose for all discharge medications in writing on discharge medication sheets. Discharge teaching included follow up with primary MD, rule 25, and CD treatment. Pt. to follow up with primary MD in a week. Pt. had no further questions at the time of discharge and no unmet needs were identified.

## 2019-04-09 NOTE — PROGRESS NOTES
Patient was called three times and no answer so post 24 hr DC follow up calls will be closed out. Phone number on file has been disconnected. Will close encounter at this time.

## 2019-06-12 ENCOUNTER — HOSPITAL ENCOUNTER (INPATIENT)
Facility: CLINIC | Age: 44
LOS: 5 days | Discharge: PSYCHIATRIC OR SUBSTANCE USE FACILITY | End: 2019-06-17
Attending: EMERGENCY MEDICINE | Admitting: PSYCHIATRY & NEUROLOGY
Payer: COMMERCIAL

## 2019-06-12 DIAGNOSIS — F10.220 ALCOHOL DEPENDENCE WITH UNCOMPLICATED INTOXICATION (H): ICD-10-CM

## 2019-06-12 PROBLEM — F19.20 CHEMICAL DEPENDENCY (H): Status: ACTIVE | Noted: 2019-06-12

## 2019-06-12 LAB
ALBUMIN SERPL-MCNC: 3.8 G/DL (ref 3.4–5)
ALP SERPL-CCNC: 75 U/L (ref 40–150)
ALT SERPL W P-5'-P-CCNC: 22 U/L (ref 0–50)
AMPHETAMINES UR QL SCN: NEGATIVE
ANION GAP SERPL CALCULATED.3IONS-SCNC: 10 MMOL/L (ref 3–14)
AST SERPL W P-5'-P-CCNC: 24 U/L (ref 0–45)
BARBITURATES UR QL: NEGATIVE
BASOPHILS # BLD AUTO: 0 10E9/L (ref 0–0.2)
BASOPHILS NFR BLD AUTO: 0.4 %
BENZODIAZ UR QL: NEGATIVE
BILIRUB SERPL-MCNC: 0.4 MG/DL (ref 0.2–1.3)
BUN SERPL-MCNC: 16 MG/DL (ref 7–30)
CALCIUM SERPL-MCNC: 8.2 MG/DL (ref 8.5–10.1)
CANNABINOIDS UR QL SCN: NEGATIVE
CHLORIDE SERPL-SCNC: 105 MMOL/L (ref 94–109)
CO2 SERPL-SCNC: 26 MMOL/L (ref 20–32)
COCAINE UR QL: NEGATIVE
CREAT SERPL-MCNC: 0.69 MG/DL (ref 0.52–1.04)
DIFFERENTIAL METHOD BLD: NORMAL
EOSINOPHIL # BLD AUTO: 0 10E9/L (ref 0–0.7)
EOSINOPHIL NFR BLD AUTO: 0.2 %
ERYTHROCYTE [DISTWIDTH] IN BLOOD BY AUTOMATED COUNT: 12.2 % (ref 10–15)
ETHANOL UR QL SCN: POSITIVE
GFR SERPL CREATININE-BSD FRML MDRD: >90 ML/MIN/{1.73_M2}
GGT SERPL-CCNC: 67 U/L (ref 0–40)
GLUCOSE SERPL-MCNC: 109 MG/DL (ref 70–99)
HCG UR QL: NEGATIVE
HCT VFR BLD AUTO: 44.1 % (ref 35–47)
HGB BLD-MCNC: 15.4 G/DL (ref 11.7–15.7)
IMM GRANULOCYTES # BLD: 0 10E9/L (ref 0–0.4)
IMM GRANULOCYTES NFR BLD: 0.2 %
LYMPHOCYTES # BLD AUTO: 2.1 10E9/L (ref 0.8–5.3)
LYMPHOCYTES NFR BLD AUTO: 39.5 %
MAGNESIUM SERPL-MCNC: 2.2 MG/DL (ref 1.6–2.3)
MCH RBC QN AUTO: 32 PG (ref 26.5–33)
MCHC RBC AUTO-ENTMCNC: 34.9 G/DL (ref 31.5–36.5)
MCV RBC AUTO: 92 FL (ref 78–100)
MONOCYTES # BLD AUTO: 0.4 10E9/L (ref 0–1.3)
MONOCYTES NFR BLD AUTO: 7.7 %
NEUTROPHILS # BLD AUTO: 2.7 10E9/L (ref 1.6–8.3)
NEUTROPHILS NFR BLD AUTO: 52 %
NRBC # BLD AUTO: 0 10*3/UL
NRBC BLD AUTO-RTO: 0 /100
OPIATES UR QL SCN: NEGATIVE
PLATELET # BLD AUTO: 213 10E9/L (ref 150–450)
POTASSIUM SERPL-SCNC: 3.4 MMOL/L (ref 3.4–5.3)
PROT SERPL-MCNC: 7.9 G/DL (ref 6.8–8.8)
RBC # BLD AUTO: 4.82 10E12/L (ref 3.8–5.2)
SODIUM SERPL-SCNC: 141 MMOL/L (ref 133–144)
WBC # BLD AUTO: 5.2 10E9/L (ref 4–11)

## 2019-06-12 PROCEDURE — 80053 COMPREHEN METABOLIC PANEL: CPT | Performed by: EMERGENCY MEDICINE

## 2019-06-12 PROCEDURE — 99285 EMERGENCY DEPT VISIT HI MDM: CPT | Mod: 25 | Performed by: EMERGENCY MEDICINE

## 2019-06-12 PROCEDURE — HZ2ZZZZ DETOXIFICATION SERVICES FOR SUBSTANCE ABUSE TREATMENT: ICD-10-PCS | Performed by: PSYCHIATRY & NEUROLOGY

## 2019-06-12 PROCEDURE — 80307 DRUG TEST PRSMV CHEM ANLYZR: CPT | Performed by: EMERGENCY MEDICINE

## 2019-06-12 PROCEDURE — 12800008 ZZH R&B CD ADULT

## 2019-06-12 PROCEDURE — 25000132 ZZH RX MED GY IP 250 OP 250 PS 637: Performed by: EMERGENCY MEDICINE

## 2019-06-12 PROCEDURE — 96361 HYDRATE IV INFUSION ADD-ON: CPT | Performed by: EMERGENCY MEDICINE

## 2019-06-12 PROCEDURE — 82977 ASSAY OF GGT: CPT | Performed by: EMERGENCY MEDICINE

## 2019-06-12 PROCEDURE — 85025 COMPLETE CBC W/AUTO DIFF WBC: CPT | Performed by: EMERGENCY MEDICINE

## 2019-06-12 PROCEDURE — 80320 DRUG SCREEN QUANTALCOHOLS: CPT | Performed by: EMERGENCY MEDICINE

## 2019-06-12 PROCEDURE — 99284 EMERGENCY DEPT VISIT MOD MDM: CPT | Mod: Z6 | Performed by: EMERGENCY MEDICINE

## 2019-06-12 PROCEDURE — 83735 ASSAY OF MAGNESIUM: CPT | Performed by: EMERGENCY MEDICINE

## 2019-06-12 PROCEDURE — 81025 URINE PREGNANCY TEST: CPT | Performed by: EMERGENCY MEDICINE

## 2019-06-12 PROCEDURE — 25000132 ZZH RX MED GY IP 250 OP 250 PS 637: Performed by: PSYCHIATRY & NEUROLOGY

## 2019-06-12 PROCEDURE — 96374 THER/PROPH/DIAG INJ IV PUSH: CPT | Performed by: EMERGENCY MEDICINE

## 2019-06-12 PROCEDURE — 25000128 H RX IP 250 OP 636: Performed by: EMERGENCY MEDICINE

## 2019-06-12 RX ORDER — BISACODYL 10 MG
10 SUPPOSITORY, RECTAL RECTAL DAILY PRN
Status: DISCONTINUED | OUTPATIENT
Start: 2019-06-12 | End: 2019-06-17 | Stop reason: HOSPADM

## 2019-06-12 RX ORDER — PROPRANOLOL HYDROCHLORIDE 20 MG/1
20 TABLET ORAL 2 TIMES DAILY
Status: DISCONTINUED | OUTPATIENT
Start: 2019-06-12 | End: 2019-06-17 | Stop reason: HOSPADM

## 2019-06-12 RX ORDER — FOLIC ACID 1 MG/1
1 TABLET ORAL ONCE
Status: COMPLETED | OUTPATIENT
Start: 2019-06-12 | End: 2019-06-12

## 2019-06-12 RX ORDER — ACETAMINOPHEN 325 MG/1
650 TABLET ORAL EVERY 4 HOURS PRN
Status: DISCONTINUED | OUTPATIENT
Start: 2019-06-12 | End: 2019-06-17 | Stop reason: HOSPADM

## 2019-06-12 RX ORDER — PRAZOSIN HYDROCHLORIDE 1 MG/1
1 CAPSULE ORAL AT BEDTIME
Status: DISCONTINUED | OUTPATIENT
Start: 2019-06-12 | End: 2019-06-17 | Stop reason: HOSPADM

## 2019-06-12 RX ORDER — TRAZODONE HYDROCHLORIDE 50 MG/1
50 TABLET, FILM COATED ORAL
Status: DISCONTINUED | OUTPATIENT
Start: 2019-06-12 | End: 2019-06-17 | Stop reason: HOSPADM

## 2019-06-12 RX ORDER — ONDANSETRON 2 MG/ML
4 INJECTION INTRAMUSCULAR; INTRAVENOUS ONCE
Status: COMPLETED | OUTPATIENT
Start: 2019-06-12 | End: 2019-06-12

## 2019-06-12 RX ORDER — LOPERAMIDE HCL 2 MG
2 CAPSULE ORAL 4 TIMES DAILY PRN
Status: DISCONTINUED | OUTPATIENT
Start: 2019-06-12 | End: 2019-06-17 | Stop reason: HOSPADM

## 2019-06-12 RX ORDER — MULTIPLE VITAMINS W/ MINERALS TAB 9MG-400MCG
1 TAB ORAL ONCE
Status: COMPLETED | OUTPATIENT
Start: 2019-06-12 | End: 2019-06-12

## 2019-06-12 RX ORDER — HYDROXYZINE HYDROCHLORIDE 25 MG/1
25 TABLET, FILM COATED ORAL EVERY 4 HOURS PRN
Status: DISCONTINUED | OUTPATIENT
Start: 2019-06-12 | End: 2019-06-17 | Stop reason: HOSPADM

## 2019-06-12 RX ORDER — MULTIPLE VITAMINS W/ MINERALS TAB 9MG-400MCG
1 TAB ORAL DAILY
Status: DISCONTINUED | OUTPATIENT
Start: 2019-06-13 | End: 2019-06-17 | Stop reason: HOSPADM

## 2019-06-12 RX ORDER — ALUMINA, MAGNESIA, AND SIMETHICONE 2400; 2400; 240 MG/30ML; MG/30ML; MG/30ML
30 SUSPENSION ORAL EVERY 4 HOURS PRN
Status: DISCONTINUED | OUTPATIENT
Start: 2019-06-12 | End: 2019-06-17 | Stop reason: HOSPADM

## 2019-06-12 RX ORDER — FOLIC ACID 1 MG/1
1 TABLET ORAL DAILY
Status: DISCONTINUED | OUTPATIENT
Start: 2019-06-13 | End: 2019-06-17 | Stop reason: HOSPADM

## 2019-06-12 RX ORDER — ATENOLOL 50 MG/1
50 TABLET ORAL DAILY PRN
Status: DISCONTINUED | OUTPATIENT
Start: 2019-06-12 | End: 2019-06-17 | Stop reason: HOSPADM

## 2019-06-12 RX ORDER — DIAZEPAM 5 MG
5-20 TABLET ORAL EVERY 30 MIN PRN
Status: DISCONTINUED | OUTPATIENT
Start: 2019-06-12 | End: 2019-06-17 | Stop reason: HOSPADM

## 2019-06-12 RX ORDER — DIAZEPAM 10 MG
10 TABLET ORAL ONCE
Status: COMPLETED | OUTPATIENT
Start: 2019-06-12 | End: 2019-06-12

## 2019-06-12 RX ORDER — LANOLIN ALCOHOL/MO/W.PET/CERES
100 CREAM (GRAM) TOPICAL DAILY
Status: DISCONTINUED | OUTPATIENT
Start: 2019-06-13 | End: 2019-06-17 | Stop reason: HOSPADM

## 2019-06-12 RX ORDER — ONDANSETRON 4 MG/1
4 TABLET, ORALLY DISINTEGRATING ORAL EVERY 6 HOURS PRN
Status: DISCONTINUED | OUTPATIENT
Start: 2019-06-12 | End: 2019-06-17 | Stop reason: HOSPADM

## 2019-06-12 RX ORDER — LANOLIN ALCOHOL/MO/W.PET/CERES
100 CREAM (GRAM) TOPICAL ONCE
Status: COMPLETED | OUTPATIENT
Start: 2019-06-12 | End: 2019-06-12

## 2019-06-12 RX ADMIN — ONDANSETRON 4 MG: 2 INJECTION INTRAMUSCULAR; INTRAVENOUS at 17:50

## 2019-06-12 RX ADMIN — PRAZOSIN HYDROCHLORIDE 1 MG: 1 CAPSULE ORAL at 20:52

## 2019-06-12 RX ADMIN — MULTIPLE VITAMINS W/ MINERALS TAB 1 TABLET: TAB at 18:16

## 2019-06-12 RX ADMIN — SODIUM CHLORIDE 1000 ML: 9 INJECTION, SOLUTION INTRAVENOUS at 15:57

## 2019-06-12 RX ADMIN — FOLIC ACID 1 MG: 1 TABLET ORAL at 18:16

## 2019-06-12 RX ADMIN — Medication 100 MG: at 18:16

## 2019-06-12 RX ADMIN — PROPRANOLOL HYDROCHLORIDE 20 MG: 20 TABLET ORAL at 20:52

## 2019-06-12 RX ADMIN — DIAZEPAM 10 MG: 5 TABLET ORAL at 20:52

## 2019-06-12 RX ADMIN — DIAZEPAM 10 MG: 10 TABLET ORAL at 18:17

## 2019-06-12 ASSESSMENT — ENCOUNTER SYMPTOMS
EYE REDNESS: 0
NECK STIFFNESS: 0
COLOR CHANGE: 0
SHORTNESS OF BREATH: 0
ARTHRALGIAS: 0
ABDOMINAL PAIN: 0
FEVER: 0
CONFUSION: 0
HEADACHES: 0
DIFFICULTY URINATING: 0

## 2019-06-12 ASSESSMENT — ACTIVITIES OF DAILY LIVING (ADL)
DRESS: INDEPENDENT;SCRUBS (BEHAVIORAL HEALTH)
ORAL_HYGIENE: INDEPENDENT
LAUNDRY: WITH SUPERVISION
HYGIENE/GROOMING: INDEPENDENT

## 2019-06-12 NOTE — ED NOTES
Pt and family informed of proper handwashing and importance of clean hand hygiene in order to prevent disease and infection transmission

## 2019-06-12 NOTE — PHARMACY-ADMISSION MEDICATION HISTORY
"Admission Medication History status for the 6/12/2019 admission is complete.  See EPIC admission navigator for Prior to Admission medications.    Medication history sources:  Patient, [facility] Every[facility], [facility]     Medication history source reliability: Good    Medication adherence:  Moderate    Changes made to PTA medication list (reason)  Added: n/a  Deleted: n/a  Changed: Melatonin 5mg tab: Take 1 tab by mouth nightly prn sleep to: Take 10mg by mouth nightly prn sleep (per patient)     Additional medication history information (including reliability of information, actions taken by pharmacist):   -Patient was a good historian; she knew names, doses, and last administration times. Unable to confirm doses and last fill dates, though, since patient has been filling medications at [facility] and they are currently closed.   -Pt has a good attitude towards taking medications and stated that she knows she \"needs to take them.\"   -Fluoxetine: Pt reports taking as prescribed and that she has been on and off of it for the last 20 years. Reports no side effects.   -Hydroxyzine: Pt reports using prn anxiety; last used yesterday   -Melatonin: Pt reports taking 2 tablets of the 5mg, as compared to the original directions of 1 tablet; did not clarify if this dose increase was self-determined or per physician.   -Prazosin: Pt reports using for PTSD and nightmares. Reports that it has been effective by drastically decreasing the frequency of nightmares.   -Propranolol: Pt reports using as a prophylaxis for migraines. Reports that it has decreased the frequency of migraines. Last took a couple of days ago.   -Preferred Pharmacy: [facility] ([phone])    Time spent in this activity: 20 minutes    Medication history completed by: Erika Sims, PD[employer] Pharmacy Intern     Prior to Admission medications    Medication Sig Last Dose Taking? Auth Provider   FLUoxetine (PROZAC) 40 MG " capsule Take 1 capsule (40 mg) by mouth daily 6/11/2019 at Unknown time Yes Jos Hays MD   folic acid (FOLVITE) 1 MG tablet Take 1 tablet (1 mg) by mouth daily 6/11/2019 at Unknown time Yes Jos Hays MD   hydrOXYzine (ATARAX) 25 MG tablet Take 1 tablet (25 mg) by mouth 3 times daily 6/11/2019 at unknown Yes Jos Hays MD   melatonin 5 MG tablet Take 10 mg by mouth nightly as needed for sleep Past Week at Unknown time Yes Unknown, Entered By History   multivitamin w/minerals (THERA-VIT-M) tablet Take 1 tablet by mouth daily 6/11/2019 at Unknown time Yes Jos Hays MD   prazosin (MINIPRESS) 1 MG capsule Take 1 capsule (1 mg) by mouth At Bedtime Past Week at Unknown time Yes Jos Hays MD   propranolol (INDERAL) 20 MG tablet Take 1 tablet (20 mg) by mouth 2 times daily Past Week at Unknown time Yes Jos Hays MD
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
## 2019-06-12 NOTE — ED NOTES
Pt states she has been living in a hotel since January.  Pt states she has been homeless since her divorce.  Pt was entering treatment today and was told to come to the ED to be medically cleared first.
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
## 2019-06-13 PROCEDURE — 99207 ZZC CDG-MDM COMPONENT: MEETS MODERATE - UP CODED: CPT | Performed by: PHYSICIAN ASSISTANT

## 2019-06-13 PROCEDURE — 25000132 ZZH RX MED GY IP 250 OP 250 PS 637: Performed by: PSYCHIATRY & NEUROLOGY

## 2019-06-13 PROCEDURE — 25000131 ZZH RX MED GY IP 250 OP 636 PS 637: Performed by: PSYCHIATRY & NEUROLOGY

## 2019-06-13 PROCEDURE — 12800008 ZZH R&B CD ADULT

## 2019-06-13 PROCEDURE — 99232 SBSQ HOSP IP/OBS MODERATE 35: CPT | Performed by: PHYSICIAN ASSISTANT

## 2019-06-13 RX ADMIN — DIAZEPAM 10 MG: 5 TABLET ORAL at 21:04

## 2019-06-13 RX ADMIN — DIAZEPAM 10 MG: 5 TABLET ORAL at 13:21

## 2019-06-13 RX ADMIN — FOLIC ACID 1 MG: 1 TABLET ORAL at 08:45

## 2019-06-13 RX ADMIN — THIAMINE HCL TAB 100 MG 100 MG: 100 TAB at 08:45

## 2019-06-13 RX ADMIN — ONDANSETRON 4 MG: 4 TABLET, ORALLY DISINTEGRATING ORAL at 17:05

## 2019-06-13 RX ADMIN — PROPRANOLOL HYDROCHLORIDE 20 MG: 20 TABLET ORAL at 08:45

## 2019-06-13 RX ADMIN — DIAZEPAM 5 MG: 5 TABLET ORAL at 18:45

## 2019-06-13 RX ADMIN — DIAZEPAM 10 MG: 5 TABLET ORAL at 04:37

## 2019-06-13 RX ADMIN — DIAZEPAM 10 MG: 5 TABLET ORAL at 15:03

## 2019-06-13 RX ADMIN — DIAZEPAM 20 MG: 5 TABLET ORAL at 17:05

## 2019-06-13 RX ADMIN — DIAZEPAM 10 MG: 5 TABLET ORAL at 08:45

## 2019-06-13 RX ADMIN — MULTIPLE VITAMINS W/ MINERALS TAB 1 TABLET: TAB at 08:45

## 2019-06-13 RX ADMIN — PROPRANOLOL HYDROCHLORIDE 20 MG: 20 TABLET ORAL at 21:04

## 2019-06-13 RX ADMIN — FLUOXETINE 40 MG: 20 CAPSULE ORAL at 08:45

## 2019-06-13 RX ADMIN — DIAZEPAM 10 MG: 5 TABLET ORAL at 00:21

## 2019-06-13 RX ADMIN — PRAZOSIN HYDROCHLORIDE 1 MG: 1 CAPSULE ORAL at 21:04

## 2019-06-13 ASSESSMENT — ACTIVITIES OF DAILY LIVING (ADL)
HYGIENE/GROOMING: INDEPENDENT
LAUNDRY: WITH SUPERVISION
DRESS: STREET CLOTHES
ORAL_HYGIENE: INDEPENDENT

## 2019-06-13 NOTE — PROGRESS NOTES
Writer met with Pt to discuss aftercare plans.  She said she is going to [facility].  She went there yesterday and they sent her here because she needs detox.  She reports they have a bed for her and she has completed the Rule 25.
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
## 2019-06-13 NOTE — PLAN OF CARE
Behavioral Team Discussion: (6/13/2019)    Continued Stay Criteria/Rationale: Patient admitted for Alcohol Use Disorder.  Plan: The following services will be provided to the patient; psychiatric assessment, medication management, therapeutic milieu, individual and group support, and skills groups.   Participants: 3A Provider: Dr. Jeannette Salgado MD; 3A RN's: Lexy Ceja, RN and David Sullivan, RN; 3A CM's: Santy Lua, Maite Moya and Radha White .  Summary/Recommendation: Providers will assess today for treatment recommendations, discharge planning, and aftercare plans. CM will meet with pt for discharge planning.   Medical/Physical: none  Precautions:   Behavioral Orders   Procedures    Code 1 - Restrict to Unit    Routine Programming     As clinically indicated    Seizure precautions    Status 15     Every 15 minutes.    Withdrawal precautions     Rationale for change in precautions or plan: N/A  Progress: No Change.

## 2019-06-13 NOTE — PROGRESS NOTES
06/12/19 1923   Patient Belongings   Did you bring any home meds/supplements to the hospital?  Yes   Disposition of meds  Other (see comment)   Patient Belongings remains with patient;sent to security per site process;returned to patient at discharge   Patient Belongings Remaining with Patient other (see comments);clothing   Patient Belongings Put in Hospital Secure Location (Security or Locker, etc.) cash/credit card;keys;clothing;shoes;purse/wallet;other (see comments)   Belongings Search Yes   Clothing Search Yes   Second Staff DON Soto and Lexy DEE RN   Med Room: wallet  Locked storage: pants with strings, shoes, short shorts and purse( cigarettes, lights, tweezers, lipsticks, powder, mascara..etc)   Security-[id number]: 2 [place] ID, [place] cards, [employer] card, [employer] card, Mastercard, [employer] card, [employer] ID card, [employer] card and "[employer] checkbook.        *NO PHONE OR CASH*   A               Admission:  I am responsible for any personal items that are not sent to the safe or pharmacy.  [facility] is not responsible for loss, theft or damage of any property in my possession.    Signature:  _________________________________ Date: _______  Time: _____                                              Staff Signature:  ____________________________ Date: ________  Time: _____      2nd Staff person, if patient is unable/unwilling to sign:    Signature: ________________________________ Date: ________  Time: _____     Discharge:  [facility] has returned all of my personal belongings:    Signature: _________________________________ Date: ________  Time: _____                                          Staff Signature:  ____________________________ Date: ________  Time: _____

## 2019-06-13 NOTE — CONSULTS
Internal Medicine Initial Visit      Pako Miller MRN# [id number]   YOB: 1975 Age: 43 year old   Date of Admission: 6/12/2019  PCP: Allen Chavez    Referring Provider: Behavioral Health - Jeannette Salgado MD  Reason for Visit: General Medical Evaluation          Assessment and Recommendations:   Pako Miller is a 43 year old year old woman with a history of alcohol dependence c/b seizures and DTs, depression/anxiety, and migraines who is admitted to [facility] for detox from alcohol.        Alcohol Dependence and Withdrawal. Management per psychiatry team.     Depression/Anxiety. Continue home meds.    Migraines. On propranolol for prophylaxis.     Medicine will sign off, please page with any additional concerns.     Renae Carvajal PA-C  Hospitalist Service   Pager: [phone]     Reason for Admission:   Alcohol Withdrawal          History of Present Illness:   History is obtained from the patient and medical record.     Pako Miller is a 43 year old year old woman with a history of alcohol abuse and depression/anxiety admitted to behavioral health for detox from alcohol. She has had DTs in the past as well as seizures.     Internal Medicine service was asked to see patient for a general medication evaluation. Currently, patient denies any complaints. She wants to rest. A little shaky. No medical concerns.           Review of Systems:   10 point ROS performed and negative unless otherwise noted in HPI           Past Medical History:   Reviewed and updated in Epic.  Past Medical History:   Diagnosis Date     Anxiety      Depressive disorder      DTs (delirium tremens) (H)      Lipoma of neck      Migraines      Ruptured disc, cervical      Seizure (H)     ETOH withdrawal     Substance abuse (H)              Past Surgical History:   Reviewed and updated in Epic.  Past Surgical History:   Procedure Laterality Date     NO HISTORY OF SURGERY               Social History:     Social  History     Tobacco Use     Smoking status: Current Every Day Smoker     Packs/day: 1.00     Years: 20.00     Pack years: 20.00     Types: Cigarettes     Smokeless tobacco: Never Used   Substance Use Topics     Alcohol use: Yes     Comment: 2 pints a day of vodka     Drug use: No     Types: Methamphetamines             Family History:   Reviewed and updated in Epic.  Family History   Problem Relation Age of Onset     Depression Mother      Anxiety Disorder Mother      Mental Illness Mother         Bulima/Anorexia     Depression Father      Anxiety Disorder Father      Bipolar Disorder Father      Substance Abuse Father      Mental Illness Father      Depression Paternal Grandmother      Anxiety Disorder Paternal Grandmother      Substance Abuse Paternal Grandmother      Substance Abuse Paternal Grandfather      Depression Brother      Anxiety Disorder Brother      Substance Abuse Brother      Depression Son      Depression Brother      Anxiety Disorder Brother      Substance Abuse Brother      Family History Negative No family hx of              Allergies:     Allergies   Allergen Reactions     Fish Allergy Anaphylaxis     Shellfish Allergy Anaphylaxis     Celebrex [Celecoxib]      Cymbalta      Vicodin [Hydrocodone-Acetaminophen] Nausea and Vomiting     Zithromax [Azithromycin Dihydrate] Nausea             Medications:     Medications Prior to Admission   Medication Sig Dispense Refill Last Dose     FLUoxetine (PROZAC) 40 MG capsule Take 1 capsule (40 mg) by mouth daily 30 capsule 0 6/11/2019 at Unknown time     folic acid (FOLVITE) 1 MG tablet Take 1 tablet (1 mg) by mouth daily 30 tablet 0 6/11/2019 at Unknown time     hydrOXYzine (ATARAX) 25 MG tablet Take 1 tablet (25 mg) by mouth 3 times daily 30 tablet 0 6/11/2019 at unknown     melatonin 5 MG tablet Take 10 mg by mouth nightly as needed for sleep   Past Week at Unknown time     multivitamin w/minerals (THERA-VIT-M) tablet Take 1 tablet by mouth daily 30 each  "0 6/11/2019 at Unknown time     prazosin (MINIPRESS) 1 MG capsule Take 1 capsule (1 mg) by mouth At Bedtime 30 capsule 0 Past Week at Unknown time     propranolol (INDERAL) 20 MG tablet Take 1 tablet (20 mg) by mouth 2 times daily 60 tablet 0 Past Week at Unknown time        Current Facility-Administered Medications   Medication     acetaminophen (TYLENOL) tablet 650 mg     alum & mag hydroxide-simethicone (MYLANTA ES/MAALOX  ES) suspension 30 mL     atenolol (TENORMIN) tablet 50 mg     bisacodyl (DULCOLAX) Suppository 10 mg     diazepam (VALIUM) tablet 5-20 mg     FLUoxetine (PROzac) capsule 40 mg     folic acid (FOLVITE) tablet 1 mg     hydrOXYzine (ATARAX) tablet 25 mg     loperamide (IMODIUM) capsule 2 mg     magnesium hydroxide (MILK OF MAGNESIA) suspension 30 mL     melatonin tablet 10 mg     multivitamin w/minerals (THERA-VIT-M) tablet 1 tablet     nicotine (NICORETTE) lozenge 4-8 mg     ondansetron (ZOFRAN-ODT) ODT tab 4 mg     prazosin (MINIPRESS) capsule 1 mg     propranolol (INDERAL) tablet 20 mg     traZODone (DESYREL) tablet 50 mg     vitamin B1 (THIAMINE) tablet 100 mg            Physical Exam:   Blood pressure 123/86, pulse 87, temperature 97.9  F (36.6  C), resp. rate 16, height 1.6 m (5' 3\"), last menstrual period 01/15/2019, SpO2 98 %.  Body mass index is 27.79 kg/m .  GENERAL: Alert and oriented x 3. Lying in bed, appears comfortable. Conversant.   HEENT: Anicteric sclera. Mucous membranes moist.   CV: RRR. S1, S2. No murmurs appreciated.   RESPIRATORY: Effort normal on room air. Lungs CTAB with no wheezing, rales, rhonchi.   GI: Abdomen soft, non distended, non tender.   NEUROLOGICAL: No focal deficits. Moves all extremities.   EXTREMITIES: No peripheral edema. Intact bilateral pedal pulses.   SKIN: No jaundice. No rashes.           Data:   CBC:  Recent Labs   Lab Test 06/12/19  1558   WBC 5.2   RBC 4.82   HGB 15.4   HCT 44.1   MCV 92   MCH 32.0   MCHC 34.9   RDW 12.2          CMP:  Recent " Labs   Lab Test 06/12/19  1558      POTASSIUM 3.4   CHLORIDE 105   YOLY 8.2*   CO2 26   BUN 16   CR 0.69   *   AST 24   ALT 22   BILITOTAL 0.4   ALBUMIN 3.8   PROTTOTAL 7.9   ALKPHOS 75       TSH:  TSH   Date Value Ref Range Status   04/04/2019 0.81 0.40 - 4.00 mU/L Final     Tox screen: + ETOH  HCG: negative    Unresulted Labs Ordered in the Past 30 Days of this Admission     No orders found for last 61 day(s).
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
## 2019-06-14 PROCEDURE — 12800008 ZZH R&B CD ADULT

## 2019-06-14 PROCEDURE — 25000132 ZZH RX MED GY IP 250 OP 250 PS 637: Performed by: PSYCHIATRY & NEUROLOGY

## 2019-06-14 PROCEDURE — 99232 SBSQ HOSP IP/OBS MODERATE 35: CPT | Performed by: PSYCHIATRY & NEUROLOGY

## 2019-06-14 RX ORDER — EPINEPHRINE 0.3 MG/.3ML
0.3 INJECTION SUBCUTANEOUS ONCE
Status: DISCONTINUED | OUTPATIENT
Start: 2019-06-14 | End: 2019-06-14

## 2019-06-14 RX ADMIN — DIAZEPAM 10 MG: 5 TABLET ORAL at 00:24

## 2019-06-14 RX ADMIN — DIAZEPAM 10 MG: 5 TABLET ORAL at 03:21

## 2019-06-14 RX ADMIN — HYDROXYZINE HYDROCHLORIDE 25 MG: 25 TABLET ORAL at 23:54

## 2019-06-14 RX ADMIN — THIAMINE HCL TAB 100 MG 100 MG: 100 TAB at 09:22

## 2019-06-14 RX ADMIN — DIAZEPAM 10 MG: 5 TABLET ORAL at 13:36

## 2019-06-14 RX ADMIN — DIAZEPAM 10 MG: 5 TABLET ORAL at 23:54

## 2019-06-14 RX ADMIN — HYDROXYZINE HYDROCHLORIDE 25 MG: 25 TABLET ORAL at 03:22

## 2019-06-14 RX ADMIN — DIAZEPAM 10 MG: 5 TABLET ORAL at 20:46

## 2019-06-14 RX ADMIN — MULTIPLE VITAMINS W/ MINERALS TAB 1 TABLET: TAB at 09:22

## 2019-06-14 RX ADMIN — HYDROXYZINE HYDROCHLORIDE 25 MG: 25 TABLET ORAL at 00:24

## 2019-06-14 RX ADMIN — PRAZOSIN HYDROCHLORIDE 1 MG: 1 CAPSULE ORAL at 20:46

## 2019-06-14 RX ADMIN — FLUOXETINE 40 MG: 20 CAPSULE ORAL at 09:22

## 2019-06-14 RX ADMIN — PROPRANOLOL HYDROCHLORIDE 20 MG: 20 TABLET ORAL at 09:22

## 2019-06-14 RX ADMIN — PROPRANOLOL HYDROCHLORIDE 20 MG: 20 TABLET ORAL at 20:46

## 2019-06-14 RX ADMIN — FOLIC ACID 1 MG: 1 TABLET ORAL at 09:22

## 2019-06-14 RX ADMIN — Medication 10 MG: at 20:46

## 2019-06-14 ASSESSMENT — ACTIVITIES OF DAILY LIVING (ADL)
ORAL_HYGIENE: INDEPENDENT
HYGIENE/GROOMING: INDEPENDENT
DRESS: STREET CLOTHES
HYGIENE/GROOMING: INDEPENDENT
LAUNDRY: WITH SUPERVISION

## 2019-06-14 NOTE — PROGRESS NOTES
Case Management Note  6/14/2019    Writer faxed GELY to [facility] ([phone]). Per , patient was supposed to admit to [facility] on 6/12 but arrived intoxicated and thus was transferred to [facility] ED. Pt left  for intake supervisor Claudia to try and coordinate a new admit date early next week when pt will likely be OOD. Awaiting return call.     Received return call from Mary Kate. She has scheduled patient for intake of Monday, 6/17/19 @ 10:30 AM. Patient informed. She will need meds ordered prior to discharge. AVS updated.     Belle Williamson LPC, [facility]

## 2019-06-14 NOTE — PLAN OF CARE
Problem: Substance Withdrawal  Goal: Substance Withdrawal  Description  Problem: General Plan of Care (Inpatient Behavioral)  Goal: Individualization/ Patient Specific Goal (IP Behavioral)  The patient and/or their representative their patient-specific goals related to the plan of care.  Patient specific goals include:  1.  Patient will be evaluated by a physician and labs will be evaluated.  2.  Patient will be seen by a  for evaluation and discharge planning.  3.  Patient will complete assessment paperwork  4.  Patient will consume 75 % of meal to meet estimated energy needs.  5.  Detoxification from alcohol using valium.  6.  Patient will be provided with alcohol relapse prevention information.Signs and symptoms of listed problems will be absent or manageable.   Outcome: No Change    Pt medicated x 1 with valium 10 mg. Pt has received a total of 135 mg of valium since admission. No oversedation noted.  Hand and head Tremors off and on.  VSS.   Pt has been in bed all shift. Pt remains on seizure and fall precautions.   Pt out for meals only . Appetite good. Fluids provided and encouraged.   Pt is to be discharged on Monday June 17 at 10:30 am to [facility] treatment program.   Pt reports mood as okay. Denies SI.

## 2019-06-14 NOTE — PROGRESS NOTES
[facility], [place]   Psychiatric Progress Note    Interim history   This is a 43 year old female with 1.  Alcohol use disorder, severe.   2.  Posttraumatic stress disorder.   3.  Major depressive disorder, recurrent, without psychotic features.   4.  Borderline personality disorder. .Pt seen in rounds. Patient's mood is good  Energy Level:MODERATE  Sleep:No concerns, sleeps well through night  Appetite:fair motivation interest improving   Denied any Suicidal/homicidal ideation/plan intent.  Denied any psychosis  No prior suicde attempts  No access to gun  Pt is in detox  Pt mssa score are monitered  Tolerating meds and has no side effects.              Medications:     Current Facility-Administered Medications   Medication     acetaminophen (TYLENOL) tablet 650 mg     alum & mag hydroxide-simethicone (MYLANTA ES/MAALOX  ES) suspension 30 mL     atenolol (TENORMIN) tablet 50 mg     bisacodyl (DULCOLAX) Suppository 10 mg     diazepam (VALIUM) tablet 5-20 mg     FLUoxetine (PROzac) capsule 40 mg     folic acid (FOLVITE) tablet 1 mg     hydrOXYzine (ATARAX) tablet 25 mg     loperamide (IMODIUM) capsule 2 mg     magnesium hydroxide (MILK OF MAGNESIA) suspension 30 mL     melatonin tablet 10 mg     multivitamin w/minerals (THERA-VIT-M) tablet 1 tablet     nicotine (NICORETTE) lozenge 4-8 mg     ondansetron (ZOFRAN-ODT) ODT tab 4 mg     prazosin (MINIPRESS) capsule 1 mg     propranolol (INDERAL) tablet 20 mg     traZODone (DESYREL) tablet 50 mg     vitamin B1 (THIAMINE) tablet 100 mg             Allergies:     Allergies   Allergen Reactions     Fish Allergy Anaphylaxis     Shellfish Allergy Anaphylaxis     Celebrex [Celecoxib]      Cymbalta      Vicodin [Hydrocodone-Acetaminophen] Nausea and Vomiting     Zithromax [Azithromycin Dihydrate] Nausea            Psychiatric Examination:   Blood pressure 121/90, pulse 85, temperature 97.9  F (36.6  C), temperature source Oral, resp. rate 16, height  "1.6 m (5' 3\"), last menstrual period 01/15/2019, SpO2 99 %.  Weight is 0 lbs 0 oz  Body mass index is 27.79 kg/m .    Appearance:  awake, alert, adequately groomed and dressed in hospital scrubs  Attitude:  cooperative  Eye Contact:  good  Mood:  better  Affect:  appropriate and in normal range and mood congruent  Speech:  clear, coherent rate /rhythm are good  Psychomotor Behavior:  no evidence of tardive dyskinesia, dystonia, or tics and intact station, gait and muscle tone  Throught Process:  logical  Associations:  no loose associations  Thought Content:  no evidence of suicidal ideation or homicidal ideation, no evidence of psychotic thought, no auditory hallucinations present and no visual hallucinations present  Insight:  good  Judgement:  intact  Oriented to:  time, person, and place  Attention Span and Concentration:  intact  Recent and Remote Memory:  intact  Language fund of knowledge are adequate         Labs:     No results found for: NTBNPI, NTBNP  Lab Results   Component Value Date    WBC 5.2 06/12/2019    HGB 15.4 06/12/2019    HCT 44.1 06/12/2019    MCV 92 06/12/2019     06/12/2019     Lab Results   Component Value Date    TSH 0.81 04/04/2019              Axis I:     1.  Alcohol use disorder, severe.   2.  Posttraumatic stress disorder.   3.  Major depressive disorder, recurrent, without psychotic features.   4.  Borderline personality disorder.      PLAN:  The patient will be detoxed off alcohol using MSSA protocol on Valium.  The patient will be seen by Case Management and Internal Medicine.  The patient will be restarted back on her medications, which include Prozac, melatonin, prazosin, propranolol, hydroxyzine, multivitamin.  She will be to detoxed and be sent to treatment.            Pt not open to naltrexone/antabuse/campral  Laboratory/Imaging: reviewed with patient   Consults: internal medicine consult reviewed  Patient will be treated in therapeutic milieu with appropriate " individual and group therapies as described.  PDMP CHECKED     Medical diagnoses to be addressed this admission:    Plan:    Alcohol Dependence and Withdrawal. Management per psychiatry team.      Depression/Anxiety. Continue home meds.    Migraines. On propranolol for prophylaxis.       Legal Status: voluntary    Safety Assessment:   Checks:  15 min  Precautions: withdrawal precautions  Pt has not required locked seclusion or restraints in the past 24 hours to maintain safety, please refer to RN documentation for further details.  Discussed with patient many issues of addiction,triggers, relapse, and establishing a solid recovery program.  Able to give informed consent:  YES   Discussed Risks/Benefits/Side Effects/Alternatives: YES    After discussion of the indications, risks, benefits, alternatives and consequences of no treatment, the patient elects to complete detox and do trt.

## 2019-06-15 PROCEDURE — 25000132 ZZH RX MED GY IP 250 OP 250 PS 637: Performed by: PSYCHIATRY & NEUROLOGY

## 2019-06-15 PROCEDURE — 12800008 ZZH R&B CD ADULT

## 2019-06-15 RX ADMIN — DIAZEPAM 10 MG: 5 TABLET ORAL at 03:42

## 2019-06-15 RX ADMIN — FOLIC ACID 1 MG: 1 TABLET ORAL at 08:29

## 2019-06-15 RX ADMIN — Medication 10 MG: at 20:40

## 2019-06-15 RX ADMIN — ACETAMINOPHEN 650 MG: 325 TABLET, FILM COATED ORAL at 16:38

## 2019-06-15 RX ADMIN — PROPRANOLOL HYDROCHLORIDE 20 MG: 20 TABLET ORAL at 20:40

## 2019-06-15 RX ADMIN — HYDROXYZINE HYDROCHLORIDE 25 MG: 25 TABLET ORAL at 03:42

## 2019-06-15 RX ADMIN — THIAMINE HCL TAB 100 MG 100 MG: 100 TAB at 08:29

## 2019-06-15 RX ADMIN — PROPRANOLOL HYDROCHLORIDE 20 MG: 20 TABLET ORAL at 08:30

## 2019-06-15 RX ADMIN — HYDROXYZINE HYDROCHLORIDE 25 MG: 25 TABLET ORAL at 16:38

## 2019-06-15 RX ADMIN — MULTIPLE VITAMINS W/ MINERALS TAB 1 TABLET: TAB at 08:29

## 2019-06-15 RX ADMIN — PRAZOSIN HYDROCHLORIDE 1 MG: 1 CAPSULE ORAL at 20:43

## 2019-06-15 RX ADMIN — FLUOXETINE 40 MG: 20 CAPSULE ORAL at 08:29

## 2019-06-15 ASSESSMENT — ACTIVITIES OF DAILY LIVING (ADL)
HYGIENE/GROOMING: INDEPENDENT
DRESS: INDEPENDENT
HYGIENE/GROOMING: INDEPENDENT
LAUNDRY: WITH SUPERVISION
ORAL_HYGIENE: INDEPENDENT

## 2019-06-16 PROCEDURE — 25000132 ZZH RX MED GY IP 250 OP 250 PS 637: Performed by: PSYCHIATRY & NEUROLOGY

## 2019-06-16 PROCEDURE — 12800008 ZZH R&B CD ADULT

## 2019-06-16 RX ADMIN — PRAZOSIN HYDROCHLORIDE 1 MG: 1 CAPSULE ORAL at 21:09

## 2019-06-16 RX ADMIN — PROPRANOLOL HYDROCHLORIDE 20 MG: 20 TABLET ORAL at 09:56

## 2019-06-16 RX ADMIN — Medication 10 MG: at 21:09

## 2019-06-16 RX ADMIN — FOLIC ACID 1 MG: 1 TABLET ORAL at 09:55

## 2019-06-16 RX ADMIN — THIAMINE HCL TAB 100 MG 100 MG: 100 TAB at 09:56

## 2019-06-16 RX ADMIN — HYDROXYZINE HYDROCHLORIDE 25 MG: 25 TABLET ORAL at 09:56

## 2019-06-16 RX ADMIN — FLUOXETINE 40 MG: 20 CAPSULE ORAL at 09:56

## 2019-06-16 RX ADMIN — MULTIPLE VITAMINS W/ MINERALS TAB 1 TABLET: TAB at 09:55

## 2019-06-16 RX ADMIN — PROPRANOLOL HYDROCHLORIDE 20 MG: 20 TABLET ORAL at 21:09

## 2019-06-16 ASSESSMENT — ACTIVITIES OF DAILY LIVING (ADL)
DRESS: INDEPENDENT
HYGIENE/GROOMING: INDEPENDENT
LAUNDRY: WITH SUPERVISION
ORAL_HYGIENE: INDEPENDENT
HYGIENE/GROOMING: INDEPENDENT

## 2019-06-16 NOTE — PROGRESS NOTES
"Patient has received 155 mg diazepam total since admission to unit 3A. She has not scored on MSSA to receive prn medication for withdrawal for over 24 hours, thus per unit standards she is considered to now be \"out of detox\".     Prn hydroxyzine given at 0955 for mild anxiety sx. She reports this medication helps \"a little\".   "

## 2019-06-17 VITALS
HEART RATE: 89 BPM | BODY MASS INDEX: 27.79 KG/M2 | SYSTOLIC BLOOD PRESSURE: 109 MMHG | RESPIRATION RATE: 16 BRPM | DIASTOLIC BLOOD PRESSURE: 77 MMHG | OXYGEN SATURATION: 100 % | TEMPERATURE: 97.6 F | HEIGHT: 63 IN

## 2019-06-17 PROCEDURE — 25000132 ZZH RX MED GY IP 250 OP 250 PS 637: Performed by: PSYCHIATRY & NEUROLOGY

## 2019-06-17 RX ADMIN — PROPRANOLOL HYDROCHLORIDE 20 MG: 20 TABLET ORAL at 07:56

## 2019-06-17 RX ADMIN — FOLIC ACID 1 MG: 1 TABLET ORAL at 07:56

## 2019-06-17 RX ADMIN — FLUOXETINE 40 MG: 20 CAPSULE ORAL at 07:56

## 2019-06-17 RX ADMIN — MULTIPLE VITAMINS W/ MINERALS TAB 1 TABLET: TAB at 07:56

## 2019-06-17 RX ADMIN — THIAMINE HCL TAB 100 MG 100 MG: 100 TAB at 07:56

## 2019-06-17 ASSESSMENT — ACTIVITIES OF DAILY LIVING (ADL)
DRESS: INDEPENDENT
ORAL_HYGIENE: INDEPENDENT
LAUNDRY: WITH SUPERVISION
HYGIENE/GROOMING: INDEPENDENT

## 2019-06-17 NOTE — PLAN OF CARE
Pt is out of detox from alcohol. Pt discharging at 10:30 am to [facility] by cab. Pt states that she has supply of all medications and does not need and discharge medications ordered. Discharge summary and labs printed and reviewed with patient. Pt discharged with all belongings and walked off of unit by [employer].

## 2019-06-17 NOTE — DISCHARGE INSTRUCTIONS
Behavioral Discharge Planning and Instructions  THANK YOU FOR CHOOSING THE [address]  [phone]    Summary: You were admitted to [facility] on 6/12/19 for detoxification from alcohol.  A medical exam was performed that included lab work. You have met with a  and opted to attend residential treatment at [facility].  Please take care and make your recovery a daily priority, Pako! It was a pleasure working with you and the entire treatment team here wishes you the very best in your recovery!     Recommendation:  Residential treatment @ [facility] on 6/17/19    Main Diagnoses:  Per Dr. Salgado:  Axis I:     1.  Alcohol use disorder, severe.   2.  Posttraumatic stress disorder.   3.  Major depressive disorder, recurrent, without psychotic features.   4.  Borderline personality disorder    Major Treatments, Procedures and Findings:  You were treated for *** detoxification using ***. As an outpatient you will be prescribed ***, please take this medication as prescribed until it is gone. You did not complete a chemical dependency assessment. You had labs drawn and those results were reviewed with you. Please take a copy of your lab work with you to your next primary care physician appointment.    Symptoms to Report:  If you experience more anxiety, confusion, sleeplessness, deep sadness or thoughts of suicide, notify your treatment team or notify your primary care physician. IF ANY OF THE SYMPTOMS YOU ARE EXPERIENCING ARE A MEDICAL EMERGENCY CALL 911 IMMEDIATELY.     Lifestyle Adjustment: Adjust your lifestyle to get enough sleep, relaxation, exercise and good nutrition. Continue to develop healthy coping skills to decrease stress and promote a sober living environment. Do not use mood altering substances including alcohol, illegal drugs or addictive medications other than what is currently prescribed.     Disposition:   [facility]  Admit date/time:  Monday, June 17th. Arrive by 10:30 AM  Address: [address], [place], [address]  Phone: [phone]  Fax: [phone]    Follow-up Appointment:   Please follow up with your medical providers after discharge to schedule appointments as needed, and/or as [facility] recommends.     Resources:   AA/NA and Sponsors are excellent resources for support and you can find one at any support group meeting.   http://www.aastpaul.org (then click meetings) This for the [facility] AA meetings  http://aamin[place].org/meetings/   This is for the [facility] AA meetings  http://www.nam[place].us (then click find a  Meeting) This is for [facility] NA   SMART Recovery - self management for addiction recovery:  www.smartrecovery.org  Pathways ~ A Health Crisis Resource & Support Center:  729.866.3268.  https://prescribetoprevent.org/patient-education/videos/  http://www.harmreduction.org  [facility] 748-276-9101  Support Group:  AA/NA and Sponsor/support.  National [place] on Mental Illness (www.[place].org): 849.393.6407 or 423-171-0685.  Alcoholics Anonymous (www.alcoholics-anonymous.org): Check your phone book for your local chapter.  Suicide Awareness Voices of Education (SAVE) (www.save.org): 789-351-AZWJ (7283)  National Suicide Prevention Line (www.mentalhealthmn.org): 647-499-RSMR (4588)  Mental Health Consumer/Survivor Network of [place] (www.mhcsn.net): 100.593.1682 or 786-337-9057  Mental Health Association of [place] (www.mentalhealth.org): 785.321.9937 or 997-101-4455   Substance Abuse and Mental Health Services (www.samhsa.gov)    [place] Recovery Connection (MRC)  [facility] connects people seeking recovery to resources that help foster and sustain long-term recovery.  Whether you are seeking resources for treatment, transportation, housing, job training, education, health care or other pathways to recovery, [facility] is a great place to start.   177.936.2213.  www.[facility].org    General Medication Instructions:   See your medication sheet(s) for instructions.   Take all medications as prescribed.  Make no changes unless your doctor suggests them.   Go to all your doctor visits.  Be sure to have all your required lab tests. This way, your medicines can be refilled on time.  Do not use any forms of alcohol.    Please Note:  If you have any questions at anytime after you are discharged please call the [facility] [place] detox unit 3AW unit at [phone].  [facility] Behavioral Intake [phone]  Medical Records call [phone]  Outpatient Behavioral Intake call [phone]  LP+ Wait List/Bed Availability call [phone]    Please take this discharge folder with you to all your follow up appointments, it contains your lab results, diagnosis, medication list and discharge recommendations.      THANK YOU FOR CHOOSING THE [facility]

## 2019-06-17 NOTE — DISCHARGE SUMMARY
Admit Date:     06/12/2019   Discharge Date:           More than 35 minutes spent on discharge summary, doing the discharge instructions, discharge medications, discharge mental status examination.      DISCHARGE DIAGNOSES:   Axis I:     1.  Alcohol use disorder, severe.   2.  Posttraumatic stress disorder.   3.  Major depressive disorder.   4.  Borderline personality disorder.        Please see the detailed admission note by Dr. Salgado on 06/13/2019.      HOSPITAL COURSE:  During the hospitalization, the patient was detoxed off alcohol using MSSA protocol on Valium.  She was continued on Prozac and prazosin.  During the hospitalization, the patient had an uneventful detox.  She was seen by eRnae Carvajal for Internal Medicine consult.  The patient had lab work done, which showed normal comprehensive metabolic panel.  Calcium was 8.2.   67.  CBC, CMP was otherwise normal.  She met with the  and the plan is to go to treatment.  She is already set up to do treatment.  She is out of detox.      DISCHARGE DISPOSITION:  The patient going to [facility] today.      DISCHARGE MENTAL STATUS EXAMINATION:  The patient is alert, oriented x 3.  Recent and remote memory, language, fund of knowledge are all adequate.  No loose associations.  The patient does not have any active suicidal ideation, plan or intent.  The patient is stable to be discharged to [facility].  According to patient, the patient has a lot of medications.               DISCHARGE MENTAL STATUS EXAMINATION:  The patient is alert, oriented x3.  Good fund of knowledge.  Good use of language.  Recent and remote memory, language, fund of knowledge are all adequate.  Euthymic mood congruent affect  Speech normal rate/rhythm linear tp no loose asso,The patient does not have any active suicidal or homicidal ideation.  Does not have any auditory or visual hallucination.  Fair insight/judgment At this time, the patient was stable to be discharged.         Pt was not determined to not be a danger to himself or others. At the current time of discharge, the patient does not meet criteria for involuntary hospitalization. On the day of discharge, the patient reports that they do not have suicidal or homicidal ideation and would never hurt themselves or others. Steps taken to minimize risk include: assessing patient s behavior and thought process daily during hospital stay, discharging patient with adequate plan for follow up for mental and physical health and discussing safety plan of returning to the hospital should the patient ever have thoughts of harming themselves or others. Therefore, based on all available evidence including the factors cited above, the patient does not appear to be at imminent risk for self-harm, and is appropriate for outpatient level of care.     Educated about side effects/risk vs benefits /alternative including non treatment.Pt consented to be on medication.     .Total time spent on discharge summary more than 35 min  More than  20 min  planning, coordination of care, medication reconciliation and performance of physical exam on day of discharge.Care was coordinated with unit RN and unit therapist       Pako Miller Medication Instructions CRISTIN:[id number]    Printed on:06/17/19 1019   Medication Information                      FLUoxetine (PROZAC) 40 MG capsule  Take 1 capsule (40 mg) by mouth daily             folic acid (FOLVITE) 1 MG tablet  Take 1 tablet (1 mg) by mouth daily             hydrOXYzine (ATARAX) 25 MG tablet  Take 1 tablet (25 mg) by mouth 3 times daily             melatonin 5 MG tablet  Take 10 mg by mouth nightly as needed for sleep             multivitamin w/minerals (THERA-VIT-M) tablet  Take 1 tablet by mouth daily             prazosin (MINIPRESS) 1 MG capsule  Take 1 capsule (1 mg) by mouth At Bedtime             propranolol (INDERAL) 20 MG tablet  Take 1 tablet (20 mg) by mouth 2 times daily              Disposition:   [facility]  Admit date/time: . Arrive by 10:30 AM  Address: [address] [place] [address], [address]  Phone: [phone]  Fax: [phone]        Data:   CBC:      Recent Labs   Lab Test 19  1558   WBC 5.2   RBC 4.82   HGB 15.4   HCT 44.1   MCV 92   MCH 32.0   MCHC 34.9   RDW 12.2            CMP:      Recent Labs   Lab Test 19  1558      POTASSIUM 3.4   CHLORIDE 105   YOLY 8.2*   CO2 26   BUN 16   CR 0.69   *   AST 24   ALT 22   BILITOTAL 0.4   ALBUMIN 3.8   PROTTOTAL 7.9   ALKPHOS 75         TSH:        TSH   Date Value Ref Range Status   2019 0.81 0.40 - 4.00 mU/L Final      Tox screen: + ETOH  HCG: negative           KT MOONEY MD             D: 2019   T: 2019   MT: MANDY      Name:     GIRISH ZELAYA   MRN:      [id number]        Account:        [id number]   :      1975           Admit Date:     2019                                  Discharge Date:       Document: [id number]       cc: Allen Chavez MD

## 2019-11-18 ENCOUNTER — OFFICE VISIT (OUTPATIENT)
Dept: FAMILY MEDICINE | Facility: OTHER | Age: 44
End: 2019-11-18
Attending: FAMILY MEDICINE

## 2019-11-18 VITALS
WEIGHT: 172 LBS | RESPIRATION RATE: 18 BRPM | HEIGHT: 64 IN | OXYGEN SATURATION: 93 % | TEMPERATURE: 97.4 F | SYSTOLIC BLOOD PRESSURE: 137 MMHG | HEART RATE: 96 BPM | BODY MASS INDEX: 29.37 KG/M2 | DIASTOLIC BLOOD PRESSURE: 106 MMHG

## 2019-11-18 DIAGNOSIS — F32.2 MAJOR DEPRESSIVE DISORDER, SINGLE EPISODE, SEVERE (H): ICD-10-CM

## 2019-11-18 DIAGNOSIS — F10.20 ALCOHOL USE DISORDER, SEVERE, DEPENDENCE (H): Primary | ICD-10-CM

## 2019-11-18 DIAGNOSIS — G43.109 MIGRAINE WITH AURA AND WITHOUT STATUS MIGRAINOSUS, NOT INTRACTABLE: ICD-10-CM

## 2019-11-18 DIAGNOSIS — Z23 NEED FOR PROPHYLACTIC VACCINATION AND INOCULATION AGAINST INFLUENZA: ICD-10-CM

## 2019-11-18 DIAGNOSIS — R03.0 ELEVATED BLOOD PRESSURE READING WITHOUT DIAGNOSIS OF HYPERTENSION: ICD-10-CM

## 2019-11-18 DIAGNOSIS — F40.01 PANIC DISORDER WITH AGORAPHOBIA: ICD-10-CM

## 2019-11-18 DIAGNOSIS — F41.1 GENERALIZED ANXIETY DISORDER: ICD-10-CM

## 2019-11-18 PROCEDURE — 90471 IMMUNIZATION ADMIN: CPT | Performed by: FAMILY MEDICINE

## 2019-11-18 PROCEDURE — G0463 HOSPITAL OUTPT CLINIC VISIT: HCPCS

## 2019-11-18 PROCEDURE — G0463 HOSPITAL OUTPT CLINIC VISIT: HCPCS | Mod: 25

## 2019-11-18 PROCEDURE — 99204 OFFICE O/P NEW MOD 45 MIN: CPT | Performed by: FAMILY MEDICINE

## 2019-11-18 PROCEDURE — 90686 IIV4 VACC NO PRSV 0.5 ML IM: CPT

## 2019-11-18 RX ORDER — NALTREXONE HYDROCHLORIDE 50 MG/1
50 TABLET, FILM COATED ORAL DAILY
COMMUNITY
End: 2019-11-18

## 2019-11-18 RX ORDER — PROPRANOLOL HYDROCHLORIDE 20 MG/1
20 TABLET ORAL 2 TIMES DAILY
Qty: 60 TABLET | Refills: 3 | Status: SHIPPED | OUTPATIENT
Start: 2019-11-18 | End: 2020-03-24

## 2019-11-18 RX ORDER — FLUOXETINE 40 MG/1
40 CAPSULE ORAL DAILY
Qty: 30 CAPSULE | Refills: 3 | Status: SHIPPED | OUTPATIENT
Start: 2019-11-18 | End: 2020-04-27

## 2019-11-18 RX ORDER — HYDROXYZINE HYDROCHLORIDE 25 MG/1
25 TABLET, FILM COATED ORAL 3 TIMES DAILY
Qty: 30 TABLET | Refills: 3 | Status: SHIPPED | OUTPATIENT
Start: 2019-11-18 | End: 2021-06-04 | Stop reason: DRUGHIGH

## 2019-11-18 RX ORDER — PRAZOSIN HYDROCHLORIDE 1 MG/1
2 CAPSULE ORAL AT BEDTIME
Qty: 30 CAPSULE | Refills: 3 | Status: SHIPPED | OUTPATIENT
Start: 2019-11-18 | End: 2021-06-04

## 2019-11-18 RX ORDER — NALTREXONE HYDROCHLORIDE 50 MG/1
50 TABLET, FILM COATED ORAL DAILY
Qty: 30 TABLET | Refills: 3 | Status: SHIPPED | OUTPATIENT
Start: 2019-11-18 | End: 2021-06-04

## 2019-11-18 ASSESSMENT — ENCOUNTER SYMPTOMS
COUGH: 0
RHINORRHEA: 0
NAUSEA: 0
DYSURIA: 0
CHILLS: 0
ABDOMINAL PAIN: 0
FEVER: 0
VOMITING: 0
DIARRHEA: 0
SHORTNESS OF BREATH: 0
SORE THROAT: 0
CONSTIPATION: 0
HEADACHES: 0

## 2019-11-18 ASSESSMENT — MIFFLIN-ST. JEOR: SCORE: 1415.19

## 2019-11-18 ASSESSMENT — PAIN SCALES - GENERAL: PAINLEVEL: NO PAIN (0)

## 2019-11-18 NOTE — PROGRESS NOTES
SUBJECTIVE:   Pako Miller is a 44 year old female who presents to clinic today for the following health issues:    HPI  Alcohol Dependency: Has a long-standing history but reports that problem has been severe since 2013.  Reports fluctuation in the amount of alcohol consumed but states that she would drink 2 L of vodka on a typical day.  Has been in detoxification approximately 30 times over the past 5 years and has been through multiple withdrawals with hospital admission to the ICU for seizures.  Recently she was in treatment at [facility] in [place] was discharged in July.  States that her last alcohol consumption was on June 13.  She has taken NyQuil in the interim but reports that this was according to instructions when she had a cold.  She has been taking Naltrexone for the past week.  She was managed on this medication previously but has been out of all medications since August secondary to homelessness.  She reports that the Naltrexone keeps her from drinking alcohol to the quantity that she otherwise would.  However, she recognizes that she cannot drink any alcohol.    Depression, Generalized Anxiety: First diagnosed in 1996.  She reports that she has been tried on numerous medications in the past.  Fluoxetine has been the most effective with the fewest side effects.  She has been taking this medication off and on for multiple years as well as participating counseling.  She has been on Klonopin in the past as needed for anxiety and reports that this helped.  She is currently taking Hydroxyzine as needed and states that she has not noticed much effect.  She has diagnoses of bipolar disorder and borderline personality disorder in her medical chart.  However, she reports that she has never had a manic episode and that her most recent psychiatrist did not believe her to have bipolar disorder.  Her symptoms have been uncontrolled since she has been out of her medication.  She reports difficulty  "sleeping, increased tearfulness, hopelessness, and self negativity.  She denies suicidal ideation or intent.  She reports that she has never attempted suicide in the past.    PTSD: Reports that symptoms primarily include panic attacks and nightmares.  Since being off her medication she states she is having panic attacks daily and nightmares every night.  She states that she has woken from sleep screaming.  Symptoms are associated with obsessive-compulsive behaviors, and she does have a history of eating disorders, primarily restrictive and/or bulimic.  She reports that she has noticed herself becoming more restrictive with her diet but denies other symptoms.    Migraine Headaches With Aura: Reports that she was first diagnosed after a work-up with neurology.  States that half of her headaches are not associated with head pain at all.  Her primary symptoms are severe visual disturbances which she describes as a \"kaleidoscope effect,\" and associated with nausea.  She reports that she previously tried to control her headaches with ibuprofen and other pain relievers; however, this caused her to develop rebound headaches.  She states that Propranolol has \"done wonders\" for her headaches and that she very rarely has headaches since she has been on maintenance therapy.    Elevated Blood Pressure: Monitors her blood pressure at home.  Reports that it seems to have been elevated since she has been out of her Propranolol medication.  No previous personal history of high blood pressure.  She does have a family history of high blood pressure.    Past Medical History:   Diagnosis Date     Anxiety      Depressive disorder      DTs (delirium tremens) (H)      Lipoma of neck      Migraines      Ruptured disc, cervical      Seizure (H)     ETOH withdrawal     Substance abuse (H)       Past Surgical History:   Procedure Laterality Date     AS EXC BENIGN SKIN LESION FACE/EARS <=0.5 CM       DILATION AND CURETTAGE       NO HISTORY OF " SURGERY       Family History   Problem Relation Age of Onset     Depression Mother      Anxiety Disorder Mother      Mental Illness Mother         Bulima/Anorexia     Depression Father      Anxiety Disorder Father      Bipolar Disorder Father      Substance Abuse Father      Mental Illness Father      Depression Paternal Grandmother      Anxiety Disorder Paternal Grandmother      Substance Abuse Paternal Grandmother      Substance Abuse Paternal Grandfather      Depression Brother      Anxiety Disorder Brother      Substance Abuse Brother      Depression Son      Depression Brother      Anxiety Disorder Brother      Substance Abuse Brother      Family History Negative No family hx of      Social History     Tobacco Use     Smoking status: Current Every Day Smoker     Packs/day: 0.50     Years: 20.00     Pack years: 10.00     Types: Cigarettes     Smokeless tobacco: Never Used   Substance Use Topics     Alcohol use: Not Currently     Comment: 2 pints a day of vodka. Sober 5 months.     Current Outpatient Medications   Medication Sig Dispense Refill     FLUoxetine (PROZAC) 40 MG capsule Take 1 capsule (40 mg) by mouth daily 30 capsule 3     hydrOXYzine (ATARAX) 25 MG tablet Take 1 tablet (25 mg) by mouth 3 times daily 30 tablet 3     naltrexone (DEPADE/REVIA) 50 MG tablet Take 1 tablet (50 mg) by mouth daily 30 tablet 3     prazosin (MINIPRESS) 1 MG capsule Take 2 capsules (2 mg) by mouth At Bedtime 30 capsule 3     propranolol (INDERAL) 20 MG tablet Take 1 tablet (20 mg) by mouth 2 times daily 60 tablet 3     folic acid (FOLVITE) 1 MG tablet Take 1 tablet (1 mg) by mouth daily 30 tablet 0     melatonin 5 MG tablet Take 10 mg by mouth nightly as needed for sleep       multivitamin w/minerals (THERA-VIT-M) tablet Take 1 tablet by mouth daily 30 each 0     Allergies   Allergen Reactions     Fish Allergy Anaphylaxis     Seafood Anaphylaxis     Shellfish Allergy Anaphylaxis     Shellfish-Derived Products Anaphylaxis and  "Hives     Azithromycin Nausea     Other reaction(s): Gastrointestinal  Swelling of mouth,      Celebrex [Celecoxib]      Cymbalta      Hydrocodone-Acetaminophen Nausea and Vomiting     Other reaction(s): Gastrointestinal     Zithromax [Azithromycin Dihydrate] Nausea       Review of Systems   Constitutional: Negative for chills and fever.   HENT: Negative for congestion, ear pain, rhinorrhea and sore throat.    Eyes: Negative for visual disturbance.   Respiratory: Negative for cough and shortness of breath.    Cardiovascular: Negative for chest pain.   Gastrointestinal: Negative for abdominal pain, constipation, diarrhea, nausea and vomiting.   Genitourinary: Negative for dysuria.   Allergic/Immunologic: Positive for food allergies.   Neurological: Negative for headaches.   Psychiatric/Behavioral: Negative for suicidal ideas.      OBJECTIVE:     BP (!) 137/106 (BP Location: Right arm, Patient Position: Sitting, Cuff Size: Adult Regular)   Pulse 96   Temp 97.4  F (36.3  C) (Tympanic)   Resp 18   Ht 1.626 m (5' 4\")   Wt 78 kg (172 lb)   LMP 01/15/2019   SpO2 93%   Breastfeeding No   BMI 29.52 kg/m    Body mass index is 29.52 kg/m .  Physical Exam  Constitutional:       General: She is not in acute distress.     Appearance: Normal appearance. She is not ill-appearing.   HENT:      Head: Normocephalic and atraumatic.      Right Ear: Tympanic membrane normal.      Left Ear: Tympanic membrane normal.      Nose: Nose normal.      Mouth/Throat:      Mouth: Mucous membranes are moist.      Pharynx: Oropharynx is clear. No oropharyngeal exudate or posterior oropharyngeal erythema.   Eyes:      General:         Right eye: No discharge.         Left eye: No discharge.      Extraocular Movements: Extraocular movements intact.      Pupils: Pupils are equal, round, and reactive to light.   Neck:      Musculoskeletal: Neck supple.   Cardiovascular:      Rate and Rhythm: Normal rate and regular rhythm.      Heart sounds: No " murmur. No gallop.    Pulmonary:      Effort: Pulmonary effort is normal.      Breath sounds: Normal breath sounds. No wheezing, rhonchi or rales.   Abdominal:      General: Bowel sounds are normal.      Palpations: Abdomen is soft. There is no mass.      Tenderness: There is no abdominal tenderness. There is no guarding or rebound.   Musculoskeletal:         General: No deformity.   Lymphadenopathy:      Cervical: No cervical adenopathy.   Skin:     General: Skin is warm and dry.   Neurological:      General: No focal deficit present.      Mental Status: She is alert.   Psychiatric:         Mood and Affect: Mood normal. Affect is tearful.       ASSESSMENT/PLAN:     1. Alcohol use disorder, severe, dependence (H)  Counseled on the importance of complete alcohol avoidance and therapy/support groups for recovery.  Continue Naltrexone; prescription provided.  List of mental health, psychiatric, and substance abuse resources for the area provided.  - naltrexone (DEPADE/REVIA) 50 MG tablet; Take 1 tablet (50 mg) by mouth daily  Dispense: 30 tablet; Refill: 3    2. Major depressive disorder, single episode, severe (H)  Symptoms previously controlled on Fluoxetine.  Counseled on SSRIs/SNRIs requiring 4 to 6 weeks to take full effect.  Counseled on possibility for increased suicidal ideation within the first 2 weeks of therapy and the need for immediate medical attention should this occur.  Restart Fluoxetine; prescription provided.  Area resources provided as above.  Counseled on the importance of psychiatric involvement given her complicated past medical history.  - FLUoxetine (PROZAC) 40 MG capsule; Take 1 capsule (40 mg) by mouth daily  Dispense: 30 capsule; Refill: 3    3. Generalized anxiety disorder  We will avoid benzodiazepine prescription given high risk potential for abuse.  Discussed transitioning from Hydroxyzine to BuSpar for improved as needed anxiety control.  She would like to continue on Hydroxyzine for  the moment.  Restart Hydroxyzine; prescription provided.  Area resources provided as above.  - FLUoxetine (PROZAC) 40 MG capsule; Take 1 capsule (40 mg) by mouth daily  Dispense: 30 capsule; Refill: 3  - hydrOXYzine (ATARAX) 25 MG tablet; Take 1 tablet (25 mg) by mouth 3 times daily  Dispense: 30 tablet; Refill: 3    4. Panic disorder with agoraphobia  Hydroxyzine prescribed as above.  Nightmares previously controlled on Prazosin.  Restart Prazosin; prescription provided.  Area resources provided as above.  - prazosin (MINIPRESS) 1 MG capsule; Take 2 capsules (2 mg) by mouth At Bedtime  Dispense: 30 capsule; Refill: 3    5. Migraine with aura and without status migrainosus, not intractable  Controlled on prophylactic regimen.  Restart Propranolol; prescription provided.  - propranolol (INDERAL) 20 MG tablet; Take 1 tablet (20 mg) by mouth 2 times daily  Dispense: 60 tablet; Refill: 3    6. Elevated blood pressure reading without diagnosis of hypertension  No personal history of hypertension.  There may be a component of rebound hypertension after abruptly discontinuing her beta-blocker.  Restart propranolol as above.  Discussed diet and lifestyle changes for hypertensive control.  Continue to monitor blood pressure at home.  If it continues to be elevated beyond the goal of less than 130/80, may consider starting medication therapy at that time.    7. Need for prophylactic vaccination and inoculation against influenza  - INFLUENZA VACCINE IM > 6 MONTHS VALENT IIV4 [89046]  - Vaccine Administration, Initial [18009]    Request medical records from previous providers.  Follow-up in 4 weeks to reassess or sooner if needed.    Cayla Castellanos [facility]

## 2019-11-18 NOTE — NURSING NOTE
"Chief Complaint   Patient presents with     Refill Request     Pt would like medications refilled and a flu shot       Initial BP (!) 137/106 (BP Location: Right arm, Patient Position: Sitting, Cuff Size: Adult Regular)   Pulse 96   Temp 97.4  F (36.3  C) (Tympanic)   Resp 18   Ht 1.626 m (5' 4\")   Wt 78 kg (172 lb)   LMP 01/15/2019   SpO2 93%   Breastfeeding No   BMI 29.52 kg/m   Estimated body mass index is 29.52 kg/m  as calculated from the following:    Height as of this encounter: 1.626 m (5' 4\").    Weight as of this encounter: 78 kg (172 lb).  Medication Reconciliation: complete    Varsha Zaragoza LPN on 11/18/2019 at 10:12 AM    "

## 2020-03-24 DIAGNOSIS — G43.109 MIGRAINE WITH AURA AND WITHOUT STATUS MIGRAINOSUS, NOT INTRACTABLE: Primary | ICD-10-CM

## 2020-03-24 RX ORDER — PROPRANOLOL HYDROCHLORIDE 20 MG/1
TABLET ORAL
Qty: 180 TABLET | Refills: 1 | Status: SHIPPED | OUTPATIENT
Start: 2020-03-24 | End: 2020-10-21

## 2020-03-24 NOTE — TELEPHONE ENCOUNTER
[facility] [place] sent Rx request for the following:      PROPRANOLOL 20 MG TABLET      Sig: TAKE 1 TABLET BY MOUTH TWICE A DAY     Last Prescription Date:   11/18/19  Last Fill Qty/Refills:         60, R-3    Last Office Visit:              11/18/19  Future Office visit:           None.  Routing refill request to provider for review/approval because:  Beta-Blockers Protocol [id number]/24/2020 11:17 AM   Blood pressure under 140/90 in past 12 months  BP Readings from Last 3 Encounters:   11/18/19 (!) 137/106   04/07/19 121/86   04/03/19 116/83        Unable to complete prescription refill per RN Medication Refill Policy. Audrey Murray RN .............. 3/24/2020  11:20 AM

## 2020-08-14 DIAGNOSIS — F41.1 GENERALIZED ANXIETY DISORDER: ICD-10-CM

## 2020-08-14 DIAGNOSIS — F32.2 MAJOR DEPRESSIVE DISORDER, SINGLE EPISODE, SEVERE (H): ICD-10-CM

## 2020-08-14 RX ORDER — FLUOXETINE 40 MG/1
CAPSULE ORAL
Qty: 30 CAPSULE | Refills: 0 | Status: SHIPPED | OUTPATIENT
Start: 2020-08-14 | End: 2021-06-04

## 2020-08-14 NOTE — TELEPHONE ENCOUNTER
[facility] sent Rx request for the following:   FLUoxetine (PROZAC) 40 MG capsule   Sig: TAKE 1 CAPSULE BY MOUTH EVERY DAY     Last Prescription Date:   04/27/2020  Last Fill Qty/Refills:         30, R-0    Last Office Visit:              11/18/2019   Future Office visit:           none    Routing refill request to provider for review/approval because:  SSRIs Protocol Failed  PHQ-9 score less than 5 in past 6 months   Recent (6 mo) or future (30 days) visit within the authorizing provider's specialty           Unable to complete prescription refill per RN Medication Refill Policy.................... Rachell Barron RN ....................  8/14/2020   12:25 PM

## 2020-08-14 NOTE — TELEPHONE ENCOUNTER
30 day prescription provided, but she needs to be seen prior to any other refills.  She was supposed to become established with a psychiatrist.

## 2020-08-20 NOTE — TELEPHONE ENCOUNTER
Attempted to reach patient, not is not accepting calls. Called pharmacy patient has all ready given prescription.     Chely Perez LPN.................. 8/20/2020 2:51 PM

## 2020-10-21 DIAGNOSIS — G43.109 MIGRAINE WITH AURA AND WITHOUT STATUS MIGRAINOSUS, NOT INTRACTABLE: ICD-10-CM

## 2020-10-21 RX ORDER — PROPRANOLOL HYDROCHLORIDE 20 MG/1
TABLET ORAL
Qty: 60 TABLET | Refills: 5 | Status: SHIPPED | OUTPATIENT
Start: 2020-10-21 | End: 2021-06-04

## 2020-10-21 NOTE — LETTER
October 21, 2020      Pako Miller  [address]  [place] [facility] [place] [address]        Dear Pako,     This letter is to remind you that you are nearly due for your annual exam with Cayla Castellanos. Your last comprehensive visit was nearly than 12 months ago.    Please call the clinic at [phone] to schedule your appointment.    Thank you for choosing [facility] and [facility] for your health care needs.    Sincerely,    Refill DARVIN  [facility]

## 2020-10-21 NOTE — TELEPHONE ENCOUNTER
[facility] in #[id number] in [employer] [facility] [place] sent Rx request for the following:      Requested Prescriptions   Pending Prescriptions Disp Refills   propranolol (INDERAL) 20 MG tablet [Pharmacy Med Name: PROPRANOLOL 20 MG TABLET] 60 tablet 5    Sig: TAKE 1 TABLET BY MOUTH TWICE A DAY   Last Prescription Date:   3/24/20  Last Fill Qty/Refills:         180, R-1    Last Office Visit:              11/18/19  Future Office visit:           None  Routing refill request to provider for review/approval because:   Beta-Blockers Protocol Failed - 10/21/2020  9:18 AM       Failed - Blood pressure under 140/90 in past 12 months    BP Readings from Last 3 Encounters:   11/18/19 (!) 137/106   04/07/19 121/86   04/03/19 116/83        Per 4/27/20 refill encounter, Pt needed to be seen. Pt cancelled and no-showed appointments on 6/5 and 7/24. No upcoming appointments noted. Unable to reach Patient. Reminder letter sent. Routing to PCP, for refill consideration. Unable to complete prescription refill per RN Medication Refill Policy. Audrey Murray RN .............. 10/21/2020  9:34 AM

## 2021-04-14 DIAGNOSIS — F41.1 GAD (GENERALIZED ANXIETY DISORDER): Primary | ICD-10-CM

## 2021-04-14 DIAGNOSIS — F32.9 MAJOR DEPRESSIVE DISORDER, SINGLE EPISODE: ICD-10-CM

## 2021-04-15 NOTE — TELEPHONE ENCOUNTER
[facility] in #[id number] in [employer] [facility] [place] sent Rx request for the following:      Requested Prescriptions   Pending Prescriptions Disp Refills   FLUoxetine (PROZAC) 20 MG capsule [Pharmacy Med Name: FLUOXETINE HCL 20 MG CAPSULE] 60 capsule 2    Sig: TAKE 2 CAPSULES BY MOUTH EVERY DAY   Last Office Visit:              11/18/19  Future Office visit:           None  Routing refill request to provider for review/approval because:   SSRIs Protocol Failed - 4/15/2021 10:13 AM       Failed - Recent (12 mo) or future (30 days) visit within the authorizing provider's specialty   Break in medication  Last prescription    Per 4/27/20 refill encounter, Pt needed to be seen. Pt cancelled and no-showed appointments on 6/5 and 7/24. No upcoming appointments noted. Unable to reach Patient. Reminder letter sent. Routing to PCP, for refill consideration.    Unable to complete prescription refill per RN Medication Refill Policy. Audrey Murray RN .............. 4/15/2021  10:17 AM

## 2021-04-16 NOTE — TELEPHONE ENCOUNTER
Attempted to call, voice mail box has not been set up.  Christine Wilson LPN ....................  4/16/2021   10:33 AM

## 2021-06-04 ENCOUNTER — OFFICE VISIT (OUTPATIENT)
Dept: FAMILY MEDICINE | Facility: OTHER | Age: 46
End: 2021-06-04
Attending: PHYSICIAN ASSISTANT
Payer: COMMERCIAL

## 2021-06-04 VITALS
SYSTOLIC BLOOD PRESSURE: 118 MMHG | HEART RATE: 121 BPM | RESPIRATION RATE: 16 BRPM | OXYGEN SATURATION: 96 % | BODY MASS INDEX: 29.39 KG/M2 | WEIGHT: 171.2 LBS | DIASTOLIC BLOOD PRESSURE: 74 MMHG | TEMPERATURE: 96.8 F

## 2021-06-04 DIAGNOSIS — F41.1 GENERALIZED ANXIETY DISORDER: ICD-10-CM

## 2021-06-04 DIAGNOSIS — F32.2 MAJOR DEPRESSIVE DISORDER, SINGLE EPISODE, SEVERE (H): ICD-10-CM

## 2021-06-04 DIAGNOSIS — F19.20 CHEMICAL DEPENDENCY (H): ICD-10-CM

## 2021-06-04 DIAGNOSIS — F10.20 ALCOHOL USE DISORDER, SEVERE, DEPENDENCE (H): ICD-10-CM

## 2021-06-04 DIAGNOSIS — G43.109 MIGRAINE WITH AURA AND WITHOUT STATUS MIGRAINOSUS, NOT INTRACTABLE: Primary | ICD-10-CM

## 2021-06-04 PROCEDURE — 99214 OFFICE O/P EST MOD 30 MIN: CPT | Performed by: PHYSICIAN ASSISTANT

## 2021-06-04 PROCEDURE — G0463 HOSPITAL OUTPT CLINIC VISIT: HCPCS

## 2021-06-04 RX ORDER — FLUOXETINE 40 MG/1
CAPSULE ORAL
Qty: 60 CAPSULE | Refills: 2 | Status: SHIPPED | OUTPATIENT
Start: 2021-06-04 | End: 2022-01-13

## 2021-06-04 RX ORDER — PROPRANOLOL HYDROCHLORIDE 20 MG/1
TABLET ORAL
Qty: 60 TABLET | Refills: 11 | Status: SHIPPED | OUTPATIENT
Start: 2021-06-04 | End: 2022-07-25

## 2021-06-04 RX ORDER — HYDROXYZINE PAMOATE 50 MG/1
50 CAPSULE ORAL 3 TIMES DAILY PRN
Qty: 30 CAPSULE | Refills: 2 | Status: SHIPPED | OUTPATIENT
Start: 2021-06-04

## 2021-06-04 ASSESSMENT — PATIENT HEALTH QUESTIONNAIRE - PHQ9: SUM OF ALL RESPONSES TO PHQ QUESTIONS 1-9: 19

## 2021-06-04 ASSESSMENT — PAIN SCALES - GENERAL: PAINLEVEL: NO PAIN (0)

## 2021-06-04 NOTE — NURSING NOTE
Chief Complaint   Patient presents with     Recheck Medication     Patient presents to the clinic today for a medication recheck  Medication Reconciliation: completed   Radha Cervantes LPN  6/4/2021 1:52 PM
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
## 2021-09-28 DIAGNOSIS — F41.1 GENERALIZED ANXIETY DISORDER: ICD-10-CM

## 2021-09-28 DIAGNOSIS — F32.2 MAJOR DEPRESSIVE DISORDER, SINGLE EPISODE, SEVERE (H): ICD-10-CM

## 2021-09-30 RX ORDER — FLUOXETINE 40 MG/1
CAPSULE ORAL
Qty: 30 CAPSULE | Refills: 5 | OUTPATIENT
Start: 2021-09-30

## 2021-09-30 NOTE — TELEPHONE ENCOUNTER
[facility] in #[id number] in [facility] [place] sent Rx request for the following:      Requested Prescriptions   Pending Prescriptions Disp Refills     FLUoxetine (PROZAC) 40 MG capsule [Pharmacy Med Name: FLUOXETINE HCL 40 MG CAPSULE] 30 capsule 5     Sig: TAKE 1 CAPSULE BY MOUTH EVERY DAY       SSRIs Protocol Failed - 9/30/2021 12:43 PM        Failed - PHQ-9 score less than 5 in past 6 months     Please review last PHQ-9 score.             Last Prescription Date:   6/4/21  Last Fill Qty/Refills:         60, R-2    Last Office Visit:              6/4/21   Future Office visit:           None  Routing refill request to provider for review/approval because:  Redundant refill request refused: Too soon:  . Fanny Mcarthur RN on 9/30/2021 at 12:45 PM

## 2022-01-11 DIAGNOSIS — F41.1 GENERALIZED ANXIETY DISORDER: ICD-10-CM

## 2022-01-11 DIAGNOSIS — F32.2 MAJOR DEPRESSIVE DISORDER, SINGLE EPISODE, SEVERE (H): ICD-10-CM

## 2022-01-13 RX ORDER — FLUOXETINE 40 MG/1
CAPSULE ORAL
Qty: 90 CAPSULE | Refills: 0 | Status: SHIPPED | OUTPATIENT
Start: 2022-01-13 | End: 2022-04-08

## 2022-07-25 DIAGNOSIS — F41.1 GENERALIZED ANXIETY DISORDER: ICD-10-CM

## 2022-07-25 DIAGNOSIS — F32.2 MAJOR DEPRESSIVE DISORDER, SINGLE EPISODE, SEVERE (H): ICD-10-CM

## 2022-07-25 DIAGNOSIS — G43.109 MIGRAINE WITH AURA AND WITHOUT STATUS MIGRAINOSUS, NOT INTRACTABLE: ICD-10-CM

## 2022-07-25 RX ORDER — PROPRANOLOL HYDROCHLORIDE 20 MG/1
TABLET ORAL
Qty: 180 TABLET | Refills: 0 | Status: SHIPPED | OUTPATIENT
Start: 2022-07-25 | End: 2023-02-03

## 2022-07-25 RX ORDER — FLUOXETINE 40 MG/1
CAPSULE ORAL
Qty: 90 CAPSULE | Refills: 0 | Status: SHIPPED | OUTPATIENT
Start: 2022-07-25 | End: 2023-02-03

## 2022-07-25 NOTE — TELEPHONE ENCOUNTER
"[facility] in #[id number] in [facility] [place] sent Rx request for the following:      Requested Prescriptions   Pending Prescriptions Disp Refills     propranolol (INDERAL) 20 MG tablet [Pharmacy Med Name: PROPRANOLOL 20 MG TABLET] 180 tablet 3     Sig: TAKE 1 TABLET BY MOUTH TWICE A DAY       Beta-Blockers Protocol Failed - 7/25/2022  3:52 PM        Failed - Blood pressure under 140/90 in past 12 months     BP Readings from Last 3 Encounters:   06/04/21 118/74   11/18/19 (!) 137/106   04/07/19 121/86           Failed - Recent (12 mo) or future (30 days) visit within the authorizing provider's specialty     Patient has had an office visit with the authorizing provider or a provider within the authorizing providers department within the previous 12 mos or has a future within next 30 days. See \"Patient Info\" tab in inbasket, or \"Choose Columns\" in Meds & Orders section of the refill encounter.     Last Prescription Date:  6/4/21  Last Fill Qty/Refills:        60, R-11         FLUoxetine (PROZAC) 40 MG capsule [Pharmacy Med Name: FLUOXETINE HCL 40 MG CAPSULE] 90 capsule 0     Sig: TAKE 1 CAPSULE BY MOUTH EVERY DAY       SSRIs Protocol Failed - 7/25/2022  3:52 PM        Failed - PHQ-9 score less than 5 in past 6 months     Please review last PHQ-9 score.           Failed - Recent (6 mo) or future (30 days) visit within the authorizing provider's specialty     Patient had office visit in the last 6 months or has a visit in the next 30 days with authorizing provider or within the authorizing provider's specialty.  See \"Patient Info\" tab in inbasket, or \"Choose Columns\" in Meds & Orders section of the refill encounter.         Last Prescription Date:  4/8/22   Last Fill Qty/Refills:         90, R-0   Last Office Visit:             6/4/21    Future Office visit:           None    Called and left a message to call back,           "

## 2022-07-25 NOTE — TELEPHONE ENCOUNTER
Refilled medication.  Needs physical prior to future refills  With well check.  Ivanna Mondragon PA-C.......... 7/25/2022 6:14 PM

## 2022-07-25 NOTE — LETTER
July 25, 2022      Pako Miller  [address]  [place] [address]        Dear Pako,     This letter is to remind you that you are due for your annual exam with Ivanna Mondragon. Your last comprehensive visit was more than 12 months ago. This will be necessary for future refills.     Please call the clinic at [phone] to schedule your appointment.    Thank you for choosing [facility] and [facility] for your health care needs.    Sincerely,    Refill RN  [facility]        Sincerely,        Your health care team.

## 2023-01-31 DIAGNOSIS — F32.2 MAJOR DEPRESSIVE DISORDER, SINGLE EPISODE, SEVERE (H): ICD-10-CM

## 2023-01-31 DIAGNOSIS — G43.109 MIGRAINE WITH AURA AND WITHOUT STATUS MIGRAINOSUS, NOT INTRACTABLE: ICD-10-CM

## 2023-01-31 DIAGNOSIS — F41.1 GENERALIZED ANXIETY DISORDER: ICD-10-CM

## 2023-01-31 NOTE — LETTER
February 3, 2023      Pako ROHIT Miller  [address] [facility] [address]          This letter is to remind you that you are due for your annual exam with Ivanna Mondragon. Your last comprehensive visit was more than 12 months ago.      Please call the clinic at [phone] to schedule your appointment.      If you are no longer seeing Ivanna Mondragon for primary care, please call to let us know.       Thank you for choosing [facility] and [facility] for your health care needs.    Sincerely,    Refill RN  [facility]            Sincerely,        Ivanna Mondragon PA-C

## 2023-02-03 RX ORDER — FLUOXETINE 40 MG/1
CAPSULE ORAL
Qty: 90 CAPSULE | Refills: 0 | Status: SHIPPED | OUTPATIENT
Start: 2023-02-03 | End: 2023-04-14

## 2023-02-03 RX ORDER — PROPRANOLOL HYDROCHLORIDE 20 MG/1
TABLET ORAL
Qty: 180 TABLET | Refills: 0 | Status: SHIPPED | OUTPATIENT
Start: 2023-02-03 | End: 2023-05-31

## 2023-02-03 NOTE — TELEPHONE ENCOUNTER
Routing refill request to provider for review/approval because:    LO:V: 6/4/21  Patient is due for annual review  Letter sent  Will route to outreach to call patient and help assist in scheduling appointment.  Radha Giles RN on 2/3/2023 at 2:57 PM

## 2023-02-06 NOTE — TELEPHONE ENCOUNTER
LMTCB to schedule appointment.    Pt is due for annual exam. Letter sent 2/3/23.    Shirley Lee on 2/6/2023 at 11:50 AM

## 2023-02-08 ENCOUNTER — TELEPHONE (OUTPATIENT)
Dept: FAMILY MEDICINE | Facility: OTHER | Age: 48
End: 2023-02-08
Payer: COMMERCIAL

## 2023-02-08 NOTE — TELEPHONE ENCOUNTER
Needs to be seen in clinic or go to the ER to discuss symptoms.   Ok to use same day.   Ivanna Mondragon PA-C ..................2/8/2023 3:36 PM

## 2023-02-08 NOTE — TELEPHONE ENCOUNTER
Patient managed to get through making an appt for px and med check- was crying uncontrolled- said too much going on can she talk to a nurse today
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
## 2023-02-08 NOTE — TELEPHONE ENCOUNTER
"Spoke to patient to notify her of message from Giancarlo.  She was very disappointed but understood, will call back first thing in the morning to hopefully get a \"cancellation\"/same day appointment and will get her medications from [facility] tonight and start taking her Prozac.  Patient notified that exercise can help reduce stress and anxiety, encouraged to get outside for a walk in the sunshine.  Elke Kendrick RN on 2/8/2023 at 4:10 PM    "

## 2023-02-08 NOTE — TELEPHONE ENCOUNTER
Confirmed with pharmacy that the meds were received on 2/3/23 and have been available for .  Will notify patient with call back.  Elke Kendrick RN on 2/8/2023 at 2:59 PM

## 2023-02-28 NOTE — PROGRESS NOTES
Patient monitored for alcohol withdrawal on MSSA with scores 6 and 7 today. Affect flat, withdrawn. Mood calm, quiet.SHowered and although spent some time resting in bed today, was out in lounge more. Reported feeling better and encouraged that she was not needing prn mediation for withdrawal as she is hoping for discharge to [facility] treatment Monday.    Home

## 2023-04-14 DIAGNOSIS — F41.1 GENERALIZED ANXIETY DISORDER: ICD-10-CM

## 2023-04-14 DIAGNOSIS — F32.2 MAJOR DEPRESSIVE DISORDER, SINGLE EPISODE, SEVERE (H): ICD-10-CM

## 2023-04-14 RX ORDER — FLUOXETINE 40 MG/1
CAPSULE ORAL
Qty: 30 CAPSULE | Refills: 0 | Status: SHIPPED | OUTPATIENT
Start: 2023-04-14

## 2023-04-14 NOTE — TELEPHONE ENCOUNTER
Refilled medication.  Needs to be seen for physical and med recheck prior to future refills.  Ivanna Mondragon PA-C.......... 4/14/2023 4:29 PM

## 2023-04-14 NOTE — TELEPHONE ENCOUNTER
[facility] sent Rx request for the following:    FLUOXETINE HCL 40 MG CAPSULE  Last Prescription Date:   2/3/23  Last Fill Qty/Refills:         90, R-0    Last Office Visit:              6/4/21   Future Office visit:           None     Far overdue for a visit.   Michelle Hahn RN on 4/14/2023 at 2:58 PM

## 2023-05-03 DIAGNOSIS — F32.2 MAJOR DEPRESSIVE DISORDER, SINGLE EPISODE, SEVERE (H): ICD-10-CM

## 2023-05-03 DIAGNOSIS — F41.1 GENERALIZED ANXIETY DISORDER: ICD-10-CM

## 2023-05-03 RX ORDER — FLUOXETINE 40 MG/1
40 CAPSULE ORAL DAILY
Qty: 30 CAPSULE | Refills: 0 | OUTPATIENT
Start: 2023-05-03

## 2023-05-03 NOTE — TELEPHONE ENCOUNTER
[facility] requesting 90 day supply for Fluoxetine. Patient is over due for a exam. This request will be denied. Fanny Mcarthur RN on 5/3/2023 at 10:35 AM

## 2023-05-03 NOTE — LETTER
May 3, 2023      Pako Miller  [address] [place] [address]        Dear Pako,     This letter is to remind you that you are due for your annual exam with Ivanna Mondragon. Your last comprehensive visit was more than 12 months ago. This appointment is necessary for future refills of medications.     Please call the clinic at [phone] to schedule your appointment.    Thank you for choosing [facility] and [facility] for your health care needs.    Sincerely,    Refill RN  [facility]

## 2023-05-23 NOTE — TELEPHONE ENCOUNTER
Pt is being seen here today for severe R L pain. Hx of  Back surgery in August 2020. Pt described the pain as a burning sensation in R hip and glute. Denies and redness and swelling. Endorses difficulty walking. Took ibuprofen at 1500 with no relief.    Routing refill request to provider for review/approval because:  SSRIs Protocol Failed 01/11/2022 12:34 AM   Protocol Details  PHQ-9 score less than 5 in past 6 months    Recent (6 mo) or future (30 days) visit within the authorizing provider's specialty     LOV: 6/4/2021    Radha Giles RN on 1/13/2022 at 9:18 AM

## 2023-05-27 DIAGNOSIS — G43.109 MIGRAINE WITH AURA AND WITHOUT STATUS MIGRAINOSUS, NOT INTRACTABLE: ICD-10-CM

## 2023-05-31 DIAGNOSIS — F32.2 MAJOR DEPRESSIVE DISORDER, SINGLE EPISODE, SEVERE (H): ICD-10-CM

## 2023-05-31 DIAGNOSIS — F41.1 GENERALIZED ANXIETY DISORDER: ICD-10-CM

## 2023-05-31 RX ORDER — FLUOXETINE 40 MG/1
CAPSULE ORAL
Qty: 30 CAPSULE | Refills: 0 | OUTPATIENT
Start: 2023-05-31

## 2023-05-31 RX ORDER — PROPRANOLOL HYDROCHLORIDE 20 MG/1
TABLET ORAL
Qty: 60 TABLET | Refills: 0 | Status: SHIPPED | OUTPATIENT
Start: 2023-05-31

## 2023-05-31 NOTE — TELEPHONE ENCOUNTER
Last Prescription Date: 2/3/23  Last Qty/Refills: 90 / 0  Last Office Visit: 6/4/21  Future Office Visit: none    Left message with patient that it has been two years since she has been seen in clinic. Will route to unit 4 scheduling. Please advise pended refill or decline due to patient not being seen recently     Corinne R Thayer, RN on 5/31/2023 at 8:57 AM       Requested Prescriptions   Pending Prescriptions Disp Refills     propranolol (INDERAL) 20 MG tablet [Pharmacy Med Name: PROPRANOLOL 20 MG TABLET] 60 tablet 2     Sig: TAKE 1 TABLET BY MOUTH TWICE A DAY       Beta-Blockers Protocol Failed - 5/27/2023  7:45 AM        Failed - Blood pressure under 140/90 in past 12 months     BP Readings from Last 3 Encounters:   06/04/21 118/74   11/18/19 (!) 137/106   04/07/19 121/86

## 2023-05-31 NOTE — TELEPHONE ENCOUNTER
Called and notified patient of refill at secure voicemail and to make an appt..  .Ozzy Ocamop LPN on 5/31/2023 at 10:40 AM

## 2023-05-31 NOTE — TELEPHONE ENCOUNTER
Sent 1 month refill to the pharmacy.  Needs to be seen for recheck prior to future refills.  Please call to help her set up an appointment.  Ivanna Mondragon PA-C.......... 5/31/2023 9:08 AM

## 2023-05-31 NOTE — TELEPHONE ENCOUNTER
"Message to pharmacy in comments. Message left on patient voicemail and letter sent. Belle Pearson RN on 5/31/2023 at 12:40 PM      Per last refill encounter:   Ivanna Mondragon PA-C 4/14/23  4:30 PM  Refilled medication.  Needs to be seen for physical and med recheck prior to future refills.  Ivanna Mondragon PA-C.......... 4/14/2023 4:29 PM          Last Prescription Date: 4/14/23 Prozac  Last Qty/Refills: 30 / 0  Last Office Visit: 6/4/21 [place]  Future Office Visit: none    Requested Prescriptions   Pending Prescriptions Disp Refills     FLUoxetine (PROZAC) 40 MG capsule [Pharmacy Med Name: FLUOXETINE HCL 40 MG CAPSULE] 30 capsule 0     Sig: TAKE 1 CAPSULE BY MOUTH EVERY DAY       SSRIs Protocol Failed - 5/31/2023 11:34 AM        Failed - PHQ-9 score less than 5 in past 6 months     Please review last PHQ-9 score.           Failed - Recent (6 mo) or future (30 days) visit within the authorizing provider's specialty     Patient had office visit in the last 6 months or has a visit in the next 30 days with authorizing provider or within the authorizing provider's specialty.  See \"Patient Info\" tab in inbasket, or \"Choose Columns\" in Meds & Orders section of the refill encounter.            Passed - Medication is active on med list        Passed - Patient is age 18 or older        Passed - No active pregnancy on record        Passed - No positive pregnancy test in last 12 months             "

## 2023-06-14 DIAGNOSIS — G43.109 MIGRAINE WITH AURA AND WITHOUT STATUS MIGRAINOSUS, NOT INTRACTABLE: ICD-10-CM

## 2023-06-15 RX ORDER — PROPRANOLOL HYDROCHLORIDE 20 MG/1
20 TABLET ORAL 2 TIMES DAILY
Qty: 180 TABLET | Refills: 0 | OUTPATIENT
Start: 2023-06-15

## 2023-06-15 NOTE — TELEPHONE ENCOUNTER
"[facility] #[id number] in [facility] in [place], [place] sent Rx request for the following:      Requested Prescriptions   Pending Prescriptions Disp Refills     propranolol (INDERAL) 20 MG tablet 180 tablet 0     Sig: Take 1 tablet (20 mg) by mouth 2 times daily       Beta-Blockers Protocol Failed - 6/15/2023  9:48 AM        Failed - Blood pressure under 140/90 in past 12 months     BP Readings from Last 3 Encounters:   06/04/21 118/74   11/18/19 (!) 137/106   04/07/19 121/86           Failed - Recent (12 mo) or future (30 days) visit within the authorizing provider's specialty     Last Prescription Date:   5/31/23  Last Fill Qty/Refills:         60, R-0      Per Ivanna Mondragon's refill note, \"Sent 1 month refill to the pharmacy.  Needs to be seen for recheck prior to future refills.\"    Pharmacy notified. Audrey Murray RN .............. 6/15/2023  9:55 AM      "

## 2023-08-12 ENCOUNTER — HOSPITAL ENCOUNTER (EMERGENCY)
Facility: CLINIC | Age: 48
Discharge: HOME | End: 2023-08-12
Attending: EMERGENCY MEDICINE | Admitting: EMERGENCY MEDICINE

## 2023-08-12 ENCOUNTER — TELEPHONE (OUTPATIENT)
Dept: BEHAVIORAL HEALTH | Facility: CLINIC | Age: 48
End: 2023-08-12

## 2023-08-12 VITALS
DIASTOLIC BLOOD PRESSURE: 100 MMHG | TEMPERATURE: 98.6 F | BODY MASS INDEX: 30.58 KG/M2 | SYSTOLIC BLOOD PRESSURE: 149 MMHG | RESPIRATION RATE: 16 BRPM | HEIGHT: 63 IN | WEIGHT: 172.6 LBS | OXYGEN SATURATION: 93 % | HEART RATE: 122 BPM

## 2023-08-12 DIAGNOSIS — R41.82 ALTERED MENTAL STATUS, UNSPECIFIED ALTERED MENTAL STATUS TYPE: ICD-10-CM

## 2023-08-12 DIAGNOSIS — F10.20 ALCOHOL USE DISORDER, SEVERE, DEPENDENCE (H): ICD-10-CM

## 2023-08-12 LAB
ALBUMIN SERPL BCG-MCNC: 4.4 G/DL (ref 3.5–5.2)
ALCOHOL BREATH TEST: 0.29 (ref 0–0.01)
ALP SERPL-CCNC: 69 U/L (ref 35–104)
ALT SERPL W P-5'-P-CCNC: 30 U/L (ref 0–50)
ANION GAP SERPL CALCULATED.3IONS-SCNC: 16 MMOL/L (ref 7–15)
AST SERPL W P-5'-P-CCNC: 46 U/L (ref 0–45)
BASOPHILS # BLD AUTO: 0 10E3/UL (ref 0–0.2)
BASOPHILS NFR BLD AUTO: 1 %
BILIRUB SERPL-MCNC: 0.4 MG/DL
BUN SERPL-MCNC: 8.6 MG/DL (ref 6–20)
CALCIUM SERPL-MCNC: 8.4 MG/DL (ref 8.6–10)
CHLORIDE SERPL-SCNC: 103 MMOL/L (ref 98–107)
CREAT SERPL-MCNC: 0.5 MG/DL (ref 0.51–0.95)
DEPRECATED HCO3 PLAS-SCNC: 22 MMOL/L (ref 22–29)
EOSINOPHIL # BLD AUTO: 0 10E3/UL (ref 0–0.7)
EOSINOPHIL NFR BLD AUTO: 1 %
ERYTHROCYTE [DISTWIDTH] IN BLOOD BY AUTOMATED COUNT: 13.9 % (ref 10–15)
GFR SERPL CREATININE-BSD FRML MDRD: >90 ML/MIN/1.73M2
GLUCOSE SERPL-MCNC: 91 MG/DL (ref 70–99)
HCT VFR BLD AUTO: 43.1 % (ref 35–47)
HGB BLD-MCNC: 15.3 G/DL (ref 11.7–15.7)
IMM GRANULOCYTES # BLD: 0 10E3/UL
IMM GRANULOCYTES NFR BLD: 0 %
LYMPHOCYTES # BLD AUTO: 2.1 10E3/UL (ref 0.8–5.3)
LYMPHOCYTES NFR BLD AUTO: 55 %
MCH RBC QN AUTO: 33.9 PG (ref 26.5–33)
MCHC RBC AUTO-ENTMCNC: 35.5 G/DL (ref 31.5–36.5)
MCV RBC AUTO: 96 FL (ref 78–100)
MONOCYTES # BLD AUTO: 0.4 10E3/UL (ref 0–1.3)
MONOCYTES NFR BLD AUTO: 12 %
NEUTROPHILS # BLD AUTO: 1.1 10E3/UL (ref 1.6–8.3)
NEUTROPHILS NFR BLD AUTO: 31 %
NRBC # BLD AUTO: 0 10E3/UL
NRBC BLD AUTO-RTO: 0 /100
PLATELET # BLD AUTO: 99 10E3/UL (ref 150–450)
POTASSIUM SERPL-SCNC: 3.6 MMOL/L (ref 3.4–5.3)
PROT SERPL-MCNC: 7.1 G/DL (ref 6.4–8.3)
RBC # BLD AUTO: 4.51 10E6/UL (ref 3.8–5.2)
SODIUM SERPL-SCNC: 141 MMOL/L (ref 136–145)
WBC # BLD AUTO: 3.7 10E3/UL (ref 4–11)

## 2023-08-12 PROCEDURE — 80053 COMPREHEN METABOLIC PANEL: CPT | Performed by: EMERGENCY MEDICINE

## 2023-08-12 PROCEDURE — 82075 ASSAY OF BREATH ETHANOL: CPT | Performed by: EMERGENCY MEDICINE

## 2023-08-12 PROCEDURE — 85025 COMPLETE CBC W/AUTO DIFF WBC: CPT | Performed by: EMERGENCY MEDICINE

## 2023-08-12 PROCEDURE — 99285 EMERGENCY DEPT VISIT HI MDM: CPT | Performed by: EMERGENCY MEDICINE

## 2023-08-12 PROCEDURE — 99283 EMERGENCY DEPT VISIT LOW MDM: CPT | Performed by: EMERGENCY MEDICINE

## 2023-08-12 PROCEDURE — 36415 COLL VENOUS BLD VENIPUNCTURE: CPT | Performed by: EMERGENCY MEDICINE

## 2023-08-12 ASSESSMENT — ACTIVITIES OF DAILY LIVING (ADL): ADLS_ACUITY_SCORE: 37

## 2023-08-12 NOTE — ED TRIAGE NOTES
Triage Assessment       Row Name 08/12/23 0240       Triage Assessment (Adult)    Airway WDL WDL

## 2023-08-12 NOTE — TELEPHONE ENCOUNTER
S: [facility] , Provider Valentino calling at 03:09 with clinical on a 48 year old/Female presenting for alcohol detox.     B: Pt presents for ETOH detox.   Currently reports drinking 2 liters of wine and a pint of vodka for years. Last detox admission in 2019.   Patient reports last use was a few hours PTA.  Pt ASHLEY: 0.29  Pt  endorses hx of DT  Pt  endorses hx of seizures. Last seizure:  Unknown  Pt endorsing the following symptoms of withdrawal:  None at this time d/t intoxication  MSSA Score: None at this time d/t intoxication    Pt denies acute mental health or medical concerns.   Pt denies other drug use: None Amount/frequency: N/A    Does Pt have a detox care plan in [facility]? No  Does pt present with specific needs, assistive devices, or exclusionary criteria? None  Is the patient ambulating, eating and drinking in the ED? Yes    A: Pt meets criteria to be presented for IP detox admission. Patient is voluntary    COVID Symptoms: No  If yes, COVID test required   Utox: Ordered, not yet collected  CMP: Abnormalities: Anion Gap 16 / Creatinine 0.50 / Calcium 8.4 / AST 46  CBC: Abnormalities: WBC 3.7 / MCH 33.9 / Platelets 99  HCG: Ordered, not yet collected     R: Patient cleared and ready for behavioral bed placement: Yes    Pt is meeting criteria for presentation to 3A/CD    PPS awaiting labs and rechecked pulse - Pulse @ 02:36: 144  Rechecked pulse @ 04:01: 122  ED reports pt medically cleared @ 04:18  Left message for [facility] provider @ 04:18. Awaiting CB  UPDATE:  ED MD called PPS @ 04:47 to report that pt wants to discharge home.   Called [facility] @ 04:51 to cancel request for phone consult    Pt will be removed from detox [facility] once discharged from the ED and PPS will no longer be following

## 2023-08-12 NOTE — DISCHARGE INSTRUCTIONS
Please use the below resources and your primary care physician to safely cease alcohol and/or substance use.     Return to the ED if you are having any urgent/life-threatening concerns.     DISCHARGE RESOURCES:  -[facility]: [phone] (detox), [phone] (outpatient & [facility])  -[facility] ([address]): [phone]  -[facility]: [phone]  -[facility]: [phone]  -SMART Recovery - self management for addiction recovery:  www.smartrecovery.org    -[facility]: [phone].  -[facility] [phone]   -Substance Abuse and Mental Health Services (www.samhsa.gov)  -Harm Reduction Coalition (www. Harmreduction.org)  -[place] Opioid Prevention Coalition: www.opioidcoalition.org  -Poison control 1-246.330.6186       Sober Support Group Information:  AA/NA & Sponsor/Support  -Alcoholics Anonymous (www.alcoholics-anonymous.org): for local information 24 hours/day  -AA Intergroup service office in [place] (http://www.aastpaul.org/) [phone]  -AA Intergroup service office in [facility]: [phone]. (http://www.a[place].org/)  -Narcotics Anonymous (www.nam[place].org) (754) 561-9602   -Sober Fun Activities: www.sober-activities.[employer].[facility]//[facility] Recovery Connection ([facility])  [facility] connects people seeking recovery to resources that help foster and sustain long-term recovery.  Whether you are seeking resources for treatment, transportation, housing, job training, education, health care or other pathways to recovery, [facility] is a great place to start.   Phone: [phone]. www.[employer] (Great listing of all types of recovery and non-recovery related resources)

## 2023-08-12 NOTE — ED NOTES
Pt's sober ride present, MD aware. Pt given discharge paperwork and instructions. No questions or concerns at this time.Pt declining reassessment of vitals at this time. Pt RR even and unlabored, no signs of acute distress. Pt A&O x4. Belongings returned to pt at time of discharge.

## 2023-08-12 NOTE — ED PROVIDER NOTES
"  History     Chief Complaint   Patient presents with    Withdrawal     Seeking detox from alcohol. Drinks 4 bottles of wine and half liter of vodka/day. Last seizure was 2 months.     HPI  Pako Miller is a 48 year old female with PMH notable for severe alcohol use disorder, borderline personality disorder, anxiety, depression who presents with altered mental status and patient reporting concern of alcohol withdrawal.  Patient reports last drink was a couple hours ago.  She reports drinking 2 L of wine and a little more than a pint of vodka per day.  She has been drinking heavily for many years.  She reports history of withdrawal seizures, most recently about a month ago.  Patient is interested in detox.  Patient notes increased anxiety and some depressed mood recently, notes that her father  last October, denies suicidal ideation.  Patient denies recent injuries.    Past Medical and Surgical History, Medications, Allergies, and Social History were reviewed in the electronic medical record. Review with the patient was attempted but limited due to altered mental status.      Review of Systems  A complete review of systems was attempted but limited by altered mental status.     Physical Exam   BP: (!) 149/100  Pulse: (!) 144  Temp: 98.6  F (37  C)  Resp: 16  Height: 160 cm (5' 3\")  Weight: 78.3 kg (172 lb 9.6 oz)  SpO2: 96 %      Physical Exam  General: smells of EtOH, no acute distress  HENT: MMM, no oropharyngeal lesions. Atraumatic head.   Eyes: PERRL, normal sclerae, nystagmus present  Neck: non-tender, supple  Cardio: Regular rate. Regular rhythm. Extremities well perfused  Resp: Normal work of breathing, normal respiratory rate  Abdomen: no tenderness, non-distended, no rebound, no guarding  Neuro: alert, slow to respond. Slurred speech. Confused. CN II-XII grossly intact. Grossly normal strength and sensation.   MSK: no deformities. Grossly normal ROM in extremities.   Integumentary/Skin: no rash " visualized, normal color    ED Course      Procedures              Labs Ordered and Resulted from Time of ED Arrival to Time of ED Departure   COMPREHENSIVE METABOLIC PANEL - Abnormal       Result Value    Sodium 141      Potassium 3.6      Chloride 103      Carbon Dioxide (CO2) 22      Anion Gap 16 (*)     Urea Nitrogen 8.6      Creatinine 0.50 (*)     Calcium 8.4 (*)     Glucose 91      Alkaline Phosphatase 69      AST 46 (*)     ALT 30      Protein Total 7.1      Albumin 4.4      Bilirubin Total 0.4      GFR Estimate >90     CBC WITH PLATELETS AND DIFFERENTIAL - Abnormal    WBC Count 3.7 (*)     RBC Count 4.51      Hemoglobin 15.3      Hematocrit 43.1      MCV 96      MCH 33.9 (*)     MCHC 35.5      RDW 13.9      Platelet Count 99 (*)     % Neutrophils 31      % Lymphocytes 55      % Monocytes 12      % Eosinophils 1      % Basophils 1      % Immature Granulocytes 0      NRBCs per 100 WBC 0      Absolute Neutrophils 1.1 (*)     Absolute Lymphocytes 2.1      Absolute Monocytes 0.4      Absolute Eosinophils 0.0      Absolute Basophils 0.0      Absolute Immature Granulocytes 0.0      Absolute NRBCs 0.0     ALCOHOL BREATH TEST POCT - Abnormal    Alcohol Breath Test 0.29 (*)      No orders to display          Medical Decision Making  The patient's presentation was of high complexity (a chronic illness severe exacerbation, progression, or side effect of treatment).    The patient's evaluation involved:  an assessment requiring an independent historian (see separate area of note for details)  review of 2 tests (EtOH level and glucose)  strong consideration of a test (CT head) that was ultimately deferred and discussion of management or test interpretation with another health professional (Behavioral Health Intake)    The patient's management necessitated high risk (a decision regarding hospitalization)      Assessments & Plan    Patient arriving with altered mental status, with reason to suspect alcohol or other drug  intoxication as etiology. Exam without findings suggestive of trauma, non-focal. Breath EtOH 0.29. Glucose  91 . Nursing notes reviewed.     AMS likely due to intoxication delirium but cannot immediately rule out other dangerous etiologies of AMS. Plan close clinical monitoring of the patient and her mental status for clearing of intoxicating substance. With approriate clearing, the patient would likely be able to be discharged. If not appropriately clearing, plan broadening of work-up, potentially including CT head and further serum labs.     Management discussed with behavioral health intake.  Patient excepted for detox admission.    With period of monitoring, the patient continues to clear appropriately but is not yet fully clinically sober.  As the patient sobered, she no longer desired detox admission.  Patient was able to coordinate a sober friend to accept care of her and transport her home.  Patient discharged with substance treatment resources, recommended follow-up with primary care in a few days for further cessation counseling, return to ED if worsening symptoms or other urgent health concerns.        Final diagnoses:   Alcohol use disorder, severe, dependence (H)   Altered mental status, unspecified altered mental status type     Discharge Medication List as of 8/12/2023  4:49 AM          --  Hal Avelar MD   Emergency Medicine   [facility] EMERGENCY DEPARTMENT  8/12/2023     Hal Avelar MD  08/12/23 0702

## 2023-08-12 NOTE — ED NOTES
Pt wanting to leave, pt educated on needing sober ride. Pt called sober ride, states they are on the way. MD updated.